# Patient Record
Sex: FEMALE | Race: WHITE | Employment: FULL TIME
[De-identification: names, ages, dates, MRNs, and addresses within clinical notes are randomized per-mention and may not be internally consistent; named-entity substitution may affect disease eponyms.]

---

## 2017-08-27 ENCOUNTER — HEALTH MAINTENANCE LETTER (OUTPATIENT)
Age: 37
End: 2017-08-27

## 2020-10-05 ENCOUNTER — TRANSFERRED RECORDS (OUTPATIENT)
Dept: HEALTH INFORMATION MANAGEMENT | Facility: CLINIC | Age: 40
End: 2020-10-05

## 2020-10-06 ENCOUNTER — TRANSFERRED RECORDS (OUTPATIENT)
Dept: HEALTH INFORMATION MANAGEMENT | Facility: CLINIC | Age: 40
End: 2020-10-06

## 2020-12-08 ENCOUNTER — TRANSFERRED RECORDS (OUTPATIENT)
Dept: HEALTH INFORMATION MANAGEMENT | Facility: CLINIC | Age: 40
End: 2020-12-08

## 2020-12-11 ENCOUNTER — TRANSFERRED RECORDS (OUTPATIENT)
Dept: HEALTH INFORMATION MANAGEMENT | Facility: CLINIC | Age: 40
End: 2020-12-11

## 2020-12-18 ENCOUNTER — MEDICAL CORRESPONDENCE (OUTPATIENT)
Dept: HEALTH INFORMATION MANAGEMENT | Facility: CLINIC | Age: 40
End: 2020-12-18

## 2020-12-18 ENCOUNTER — TRANSFERRED RECORDS (OUTPATIENT)
Dept: HEALTH INFORMATION MANAGEMENT | Facility: CLINIC | Age: 40
End: 2020-12-18

## 2020-12-21 ENCOUNTER — TRANSFERRED RECORDS (OUTPATIENT)
Dept: HEALTH INFORMATION MANAGEMENT | Facility: CLINIC | Age: 40
End: 2020-12-21

## 2020-12-22 ENCOUNTER — TRANSCRIBE ORDERS (OUTPATIENT)
Dept: OTHER | Age: 40
End: 2020-12-22

## 2020-12-22 DIAGNOSIS — K58.0 IRRITABLE BOWEL SYNDROME WITH DIARRHEA: Primary | ICD-10-CM

## 2020-12-22 DIAGNOSIS — R10.84 ABDOMINAL PAIN, GENERALIZED: ICD-10-CM

## 2020-12-22 DIAGNOSIS — K86.0 ALCOHOL-INDUCED CHRONIC PANCREATITIS (H): Primary | ICD-10-CM

## 2020-12-23 ENCOUNTER — TELEPHONE (OUTPATIENT)
Dept: GASTROENTEROLOGY | Facility: CLINIC | Age: 40
End: 2020-12-23

## 2020-12-23 NOTE — TELEPHONE ENCOUNTER
Advanced [facility]oscopy     Referring provider:  Candy Colmenares MD    Referred to: [facility]     Provider Requested:  NA     Referral Received: 12/23/2020     Records received: in CareEverywhere     Images received: in Pacs    Evaluation for: PD stones, chronic pancreatitis, pancreatic  divisum?     Clinical History (per RN review):     39 y.o. old female who is seen as a consult for evaluation of chronic pancreatitis. Patient having persistent abdominal pain and weight loss. Off alcohol for last two years. Several years of recurrent pancreatitis. Imaging now showed lots of calcifications in the head of pancreas and a dilated PD.     . Main duct dilated throughout body and tail. Duct does look like it is heading into minor papilla. Suspicious for pancreas divisum, although official report not able to deny or make diagnosis of pancreas divisum.   I will refer patient to [facility] for their expert opinion about possible ERCP for PD stone removal and treatment for chronic pancreatitis.      EUS 12/11/2020  Recommendation:      - Discharge patient to home.                       - obtain MRCP to try to see PD better.                       - try pancreatic enzyme                       - follow up with phone visit after MRCP. Will discuss                        referral to the [facility] if no improvement in symptoms.    MRCP 12/18/2020  INDICATION: Alcohol induced chronic pancreatitis. Concern for   pancreatic ductal stricture and calculi.    IMPRESSION:   1.  Extensive changes of chronic pancreatitis with evidence for main   ductal stricture and at least one intraductal calculus in the head as   above. Consider ERCP with consideration of stent placement as   clinically warranted.  2.  No superimposed acute pancreatitis or pseudocyst.  3.  Mild hepatic steatosis. Liver parenchyma otherwise normal.  4.  No biliary dilatation or choledocholithiasis.  MD review date:   MD Decision for clinic consultation/Orders:             Referral updates/Patient contacted:   12/30- called patient, talked to her, updated on POC  Called again to get imaging, not pushed on 12/28  [facility]    [phone]    1/5- called to get imaging  [facility]    [address]    [address] [place] [address]    Phone: [phone]    Fax: [phone]

## 2021-02-15 ENCOUNTER — TRANSFERRED RECORDS (OUTPATIENT)
Dept: GASTROENTEROLOGY | Facility: CLINIC | Age: 41
End: 2021-02-15

## 2021-02-15 ENCOUNTER — VIRTUAL VISIT (OUTPATIENT)
Dept: GASTROENTEROLOGY | Facility: CLINIC | Age: 41
End: 2021-02-15
Payer: COMMERCIAL

## 2021-02-15 VITALS — HEIGHT: 63 IN | WEIGHT: 133 LBS | BODY MASS INDEX: 23.57 KG/M2

## 2021-02-15 DIAGNOSIS — Q45.3 PANCREAS DIVISUM: Primary | ICD-10-CM

## 2021-02-15 PROCEDURE — 99204 OFFICE O/P NEW MOD 45 MIN: CPT | Mod: GT | Performed by: INTERNAL MEDICINE

## 2021-02-15 RX ORDER — PROCHLORPERAZINE MALEATE 10 MG
10 TABLET ORAL
COMMUNITY
Start: 2020-11-21 | End: 2021-06-11

## 2021-02-15 RX ORDER — ATENOLOL 25 MG/1
25 TABLET ORAL DAILY
COMMUNITY
Start: 2019-11-12

## 2021-02-15 RX ORDER — GABAPENTIN 100 MG/1
100 CAPSULE ORAL 3 TIMES DAILY
COMMUNITY
End: 2021-08-02

## 2021-02-15 SDOH — HEALTH STABILITY: MENTAL HEALTH: HOW OFTEN DO YOU HAVE A DRINK CONTAINING ALCOHOL?: NOT ASKED

## 2021-02-15 SDOH — HEALTH STABILITY: MENTAL HEALTH: HOW MANY STANDARD DRINKS CONTAINING ALCOHOL DO YOU HAVE ON A TYPICAL DAY?: NOT ASKED

## 2021-02-15 SDOH — HEALTH STABILITY: MENTAL HEALTH: HOW OFTEN DO YOU HAVE 6 OR MORE DRINKS ON ONE OCCASION?: NOT ASKED

## 2021-02-15 ASSESSMENT — MIFFLIN-ST. JEOR: SCORE: 1242.41

## 2021-02-15 ASSESSMENT — PAIN SCALES - GENERAL: PAINLEVEL: NO PAIN (0)

## 2021-02-15 NOTE — LETTER
2/15/2021      RE: Keerthi Montemayor  [address] [facility]  [address]  [place] [address]      Dear Colleague,    Thank you for referring your patient, Keerthi Montemayor, to the [facility] [place]. Please see a copy of my visit note below.    Start: 02/15/2021 01:45 pm  Stop: 02/15/2021 02:30 pm      Pt referred by Dr Colmenares of /[facility]    Recurrent episodes of acute on chronic pancreatitis x 8 years. EUS findings of intraductal pancreatic stones.Likely divisum. Referred for consideration of endoscopic therapy. Abstinent from alcohol for 2 full years. In a program till a year ago, now sustained sobriety. Smoking down to 3 cigarettes daily from a pack, x many years.    About 7-8 hospitalizations over 8 years. Longest 7 days.    Recently seen Dr Colmenares at . Had severe flare of abominal pain. Lasting months. About December, pain relented so having minimal pain now. Tried PERT but no effect on pain at that time.     CT and MRCP uploaded, reviewed. That and EUS by Dr Colmenares, all show diffusely dilated dorsal duct down to minor papilla, with 5-6mm intraductal stone near minor papilla. Large cluster of stones in ventral duct, apparent pancreas divisum, probably congenital, although could represent pseudodivisum due to stone disease    EUS 12/11/2020 Dr Colmenares       39 year old female with history of alcohol abuse,                        stopped 2 years ago. Now with persistent abdominal                        pain, significant weight loss. CT showed calcifiic                        chronic pancreatitis.                       - severe chronic pancreatitis, especially head of                        pancreas, with many shadowing calcifications. One 5mm                        intraductal stone in the superior head of pancreas                        (near the minor papilla) seems to be obstructing the                        main pancreatic duct. Duct dilated to 8mm in body.                        Unable to  exclude pancreas divisum as the main PD                        could not be visualized completely in head of                        pancreas, likely due to strictures. There was more                        stone material within an anechoic tubular structure,                        presumed to be the ventral pancreatic duct. No cystic                        or mass lesion seen in this exam.                       - distal CBD stricture as it goes through head of                        pancreas, but no upstream dilation. Duct went from                        4.8mm in mid portion to 1.8mm in distal portion.                        Normal gallbladder                       - bulging appearance to major papilla corresponding                        to 8mm thickening of deep mucosa.    On no current pain meds. Lexapro, atenolol, rest as below      PAST MEDICAL HISTORY: The patient   Past Medical History:   Diagnosis Date     Alcoholic hepatitis     Alcoholism ([facility]C)     Chronic pancreatitis ([facility]C)     GERD (gastroesophageal reflux disease)   HTN    PAST SURGICAL HISTORY:   Past Surgical History:   Procedure Laterality Date     SURGICAL HX - NEG     MEDICATIONS: Patient   Current Outpatient Medications   Medication Sig Dispense Refill     aluminum & magnesium hydroxide-simethicone (MAALOX MAX) 400-400-40 MG/5ML suspension Take 15 mL by mouth every 6 hours as needed. 355 mL 1     atenolol (TENORMIN) 25 MG tablet Take 1 Tablet by mouth daily. 90 Tablet 3     escitalopram oxalate (LEXAPRO) 10 MG tablet Take 1 Tablet by mouth daily. 30 Tablet 0     hydrOXYzine HCl (ATARAX) 25 MG tablet Take 1 Tablet by mouth three times a day as needed. 30 Tablet 1     lidocaine viscous (XYLOCAINE) 2 % solution Take 15 mL by mouth every 6 hours as needed for Pain. 100 mL 0     omeprazole (PRILOSEC) 20 MG capsule Take 2 Capsules by mouth daily. Take 1 hour before a meal. 90 Capsule 3     ondansetron (ZOFRAN-ODT) 4 MG disintegrating tablet Place 4 mg  under tongue as needed.     pantoprazole (PROTONIX) 40 MG tablet Take 1 Tablet by mouth daily. 90 Tablet 3     prochlorperazine (COMPAZINE) 10 MG tablet Take 1 Tablet by mouth every 8 hours as needed for Nausea or Vomiting. 30 Tablet 1     sucralfate (CARAFATE) 1 g tablet COMBINE 1 TAB WITH 10 ML OF WATER & TAKE 4 TIMES DAILY BEFORE MEALS 120 Tablet 2     triamcinolone acetonide (KENALOG) 0.1 % cream Apply topically three times a day. For rash for up to 2 weeks at an episode. Indications: Skin Inflammation 80 g 3     Current Facility-Administered Medications   Medication Dose Route Frequency Provider Last Rate Last Admin     butamben-tetracaine-benzocaine (CETACAINE) 2-2-14 % spray 1-2 Spray 1-2 Spray Topical PRN with procedures Candy Colmenares MD     diphenhydrAMINE (BENADRYL) injection 25-50 mg 25-50 mg Intravenous ONCE PRN Candy Colmenares MD     fentaNYL (SUBLIMAZE) injection  mcg  mcg Intravenous PRN with procedures Candy Colmenares MD     flumazenil (ROMAZICON) injection 0.2-0.5 mg 0.2-0.5 mg Intravenous PRN with procedures Candy Colmenares MD     midazolam (VERSED) injection 0.5-2 mg 0.5-2 mg Intravenous PRN with procedures Candy Colmenares MD     naloxone (NARCAN) injection 0.4-2 mg 0.4-2 mg Intravenous PRN with procedures Candy Colmenares MD     sodium chloride 0.9% infusion 500 mL Intravenous PRN per Parameters Candy Colmenares MD     ALLERGIES: Amoxicillin    SOCIAL HISTORY: The patient reports that she has been smoking. She has never used smokeless tobacco. She reports previous alcohol use.    FAMILY HISTORY: The patient's family history includes Hyperlipidemia in her birth mother; Hypertension in her birth mother.     IMP: 45 minutes more than 50% counseling. Total 50 minutes including review of outside imaging, CT, MRCP and EUS report  We discussed that although pain free currently, with obstructing intraductal stone and history of recurrent acute and chronic  pancreatitis flares. Recurrence very likely. All imaging and EUS suggest that complete dorsal pancreatic duct clearance very feasible, via minor papilla. Would not attempt to clear ventral duct due to large stone burden, lack of obstruction there. Overall, results of complete endoscopic duct clearance similar to decompressive surgery, which is not necessary in this patient. Went over risks of complications, which are relatively low but real, and chance of benefit, which is relatively high although never certain. Also discussed other adjunctive measures including dietitian consult, if covered here, and eventual trial of enzymes, but after stone clearance.    1) Schedule ERCP w Dr Ashford in next month.   2) Having H and P at [facility] today, so above ideal  3) Ling Roldan RD consult for chronic pancreatitis diet , if covered by [facility]  4) + - restarting enzymes, might wait until after PD stone extraction complete     Thanks for Dr Colmenares for this kind referral    Janes Ashford MD GI Staff

## 2021-02-15 NOTE — NURSING NOTE
Per provider request, get CT from [facility]    Called [facility], asked them to push records  EXAM: CT ABDOMEN PELVIS W  LOCATION: [facility]  DATE/TIME: 8/13/2020 8:14 AM    Called file room to attach, provider updated.    ML

## 2021-02-15 NOTE — NURSING NOTE
"Chief Complaint   Patient presents with     Consult     New patient consult video visit.        Vitals:    02/15/21 1253   Weight: 60.3 kg (133 lb)   Height: 1.6 m (5' 3\")       Body mass index is 23.56 kg/m .    Ronny Solis LPN    "

## 2021-02-15 NOTE — PROGRESS NOTES
Start: 02/15/2021 01:45 pm  Stop: 02/15/2021 02:30 pm      Pt referred by Dr Colmenares of /[facility]    Recurrent episodes of acute on chronic pancreatitis x 8 years. EUS findings of intraductal pancreatic stones.Likely divisum. Referred for consideration of endoscopic therapy. Abstinent from alcohol for 2 full years. In a program till a year ago, now sustained sobriety. Smoking down to 3 cigarettes daily from a pack, x many years.    About 7-8 hospitalizations over 8 years. Longest 7 days.    Recently seen Dr Colmenares at . Had severe flare of abominal pain. Lasting months. About December, pain relented so having minimal pain now. Tried PERT but no effect on pain at that time.     CT and MRCP uploaded, reviewed. That and EUS by Dr Colmenares, all show diffusely dilated dorsal duct down to minor papilla, with 5-6mm intraductal stone near minor papilla. Large cluster of stones in ventral duct, apparent pancreas divisum, probably congenital, although could represent pseudodivisum due to stone disease    EUS 12/11/2020 Dr Colmenares       39 year old female with history of alcohol abuse,                        stopped 2 years ago. Now with persistent abdominal                        pain, significant weight loss. CT showed calcifiic                        chronic pancreatitis.                       - severe chronic pancreatitis, especially head of                        pancreas, with many shadowing calcifications. One 5mm                        intraductal stone in the superior head of pancreas                        (near the minor papilla) seems to be obstructing the                        main pancreatic duct. Duct dilated to 8mm in body.                        Unable to exclude pancreas divisum as the main PD                        could not be visualized completely in head of                        pancreas, likely due to strictures. There was more                        stone material within an anechoic tubular structure,                         presumed to be the ventral pancreatic duct. No cystic                        or mass lesion seen in this exam.                       - distal CBD stricture as it goes through head of                        pancreas, but no upstream dilation. Duct went from                        4.8mm in mid portion to 1.8mm in distal portion.                        Normal gallbladder                       - bulging appearance to major papilla corresponding                        to 8mm thickening of deep mucosa.    On no current pain meds. Lexapro, atenolol, rest as below      PAST MEDICAL HISTORY: The patient   Past Medical History:   Diagnosis Date     Alcoholic hepatitis     Alcoholism ([facility]C)     Chronic pancreatitis ([facility]C)     GERD (gastroesophageal reflux disease)   HTN    PAST SURGICAL HISTORY:   Past Surgical History:   Procedure Laterality Date     SURGICAL HX - NEG     MEDICATIONS: Patient   Current Outpatient Medications   Medication Sig Dispense Refill     aluminum & magnesium hydroxide-simethicone (MAALOX MAX) 400-400-40 MG/5ML suspension Take 15 mL by mouth every 6 hours as needed. 355 mL 1     atenolol (TENORMIN) 25 MG tablet Take 1 Tablet by mouth daily. 90 Tablet 3     escitalopram oxalate (LEXAPRO) 10 MG tablet Take 1 Tablet by mouth daily. 30 Tablet 0     hydrOXYzine HCl (ATARAX) 25 MG tablet Take 1 Tablet by mouth three times a day as needed. 30 Tablet 1     lidocaine viscous (XYLOCAINE) 2 % solution Take 15 mL by mouth every 6 hours as needed for Pain. 100 mL 0     omeprazole (PRILOSEC) 20 MG capsule Take 2 Capsules by mouth daily. Take 1 hour before a meal. 90 Capsule 3     ondansetron (ZOFRAN-ODT) 4 MG disintegrating tablet Place 4 mg under tongue as needed.     pantoprazole (PROTONIX) 40 MG tablet Take 1 Tablet by mouth daily. 90 Tablet 3     prochlorperazine (COMPAZINE) 10 MG tablet Take 1 Tablet by mouth every 8 hours as needed for Nausea or Vomiting. 30 Tablet 1     sucralfate (CARAFATE) 1 g tablet  COMBINE 1 TAB WITH 10 ML OF WATER & TAKE 4 TIMES DAILY BEFORE MEALS 120 Tablet 2     triamcinolone acetonide (KENALOG) 0.1 % cream Apply topically three times a day. For rash for up to 2 weeks at an episode. Indications: Skin Inflammation 80 g 3     Current Facility-Administered Medications   Medication Dose Route Frequency Provider Last Rate Last Admin     butamben-tetracaine-benzocaine (CETACAINE) 2-2-14 % spray 1-2 Spray 1-2 Spray Topical PRN with procedures Candy Colmenares MD     diphenhydrAMINE (BENADRYL) injection 25-50 mg 25-50 mg Intravenous ONCE PRN Candy Colmenares MD     fentaNYL (SUBLIMAZE) injection  mcg  mcg Intravenous PRN with procedures Candy Colmenares MD     flumazenil (ROMAZICON) injection 0.2-0.5 mg 0.2-0.5 mg Intravenous PRN with procedures Candy Colmenares MD     midazolam (VERSED) injection 0.5-2 mg 0.5-2 mg Intravenous PRN with procedures Candy Colmenares MD     naloxone (NARCAN) injection 0.4-2 mg 0.4-2 mg Intravenous PRN with procedures Candy Colmenares MD     sodium chloride 0.9% infusion 500 mL Intravenous PRN per Parameters Candy Colmenares MD     ALLERGIES: Amoxicillin    SOCIAL HISTORY: The patient reports that she has been smoking. She has never used smokeless tobacco. She reports previous alcohol use.    FAMILY HISTORY: The patient's family history includes Hyperlipidemia in her birth mother; Hypertension in her birth mother.     IMP: 45 minutes more than 50% counseling. Total 50 minutes including review of outside imaging, CT, MRCP and EUS report  We discussed that although pain free currently, with obstructing intraductal stone and history of recurrent acute and chronic pancreatitis flares. Recurrence very likely. All imaging and EUS suggest that complete dorsal pancreatic duct clearance very feasible, via minor papilla. Would not attempt to clear ventral duct due to large stone burden, lack of obstruction there. Overall, results of  complete endoscopic duct clearance similar to decompressive surgery, which is not necessary in this patient. Went over risks of complications, which are relatively low but real, and chance of benefit, which is relatively high although never certain. Also discussed other adjunctive measures including dietitian consult, if covered here, and eventual trial of enzymes, but after stone clearance.    1) Schedule ERCP w Dr Ashford in next month.   2) Having H and P at [facility] today, so above ideal  3) Ling Roldan RD consult for chronic pancreatitis diet , if covered by [facility]  4) + - restarting enzymes, might wait until after PD stone extraction complete       Thanks for Dr Colmenares for this kind referral    Janes Ashford MD GI Staff

## 2021-02-16 ENCOUNTER — TELEPHONE (OUTPATIENT)
Dept: GASTROENTEROLOGY | Facility: CLINIC | Age: 41
End: 2021-02-16

## 2021-02-16 NOTE — TELEPHONE ENCOUNTER
Called Pt to follow up on clinic visit yesterday with Dr. Ashford. No answer. Left VM to call back. Will also need to schedule for ERCP with Dr. Ashford. Dates from [facility] are: 3/23 (only one more opening) and 3/30.    Bandar Javier LPN

## 2021-02-16 NOTE — PATIENT INSTRUCTIONS
Follow up:    Dr. Ashford has outlined the following steps after your recent clinic visit:    1) Schedule ERCP with Dr Ashford in the next month.   2) Having H and P at [facility] today  3) Schedule for a consult with Dietitian Ling Roldan RD for chronic pancreatitis diet    Please call with any questions or concerns regarding your clinic visit today.    It is a pleasure being involved in your health care.     Contacts post-consultation depending on your need:    Schedule Clinic Appointments            [phone] # 1   M-F 7:30 - 5 pm    Lolita Taylor RN Care Coordinator  [phone]    Bandar Javier LPN    [phone]     OR Procedure Scheduling                             [phone]    My Chart is available 24 hours a day and is a secure way to access your records and communicate with your care team.  I strongly recommend signing up if you haven't already done so, if you are comfortable with computers.  If you would like to inquire about this or are having problems with My Chart access, you may call [phone] or go online at [email or web address].  Please allow at least 24 hours for a response and extra time on weekends and Holidays.

## 2021-02-17 ENCOUNTER — TELEPHONE (OUTPATIENT)
Dept: GASTROENTEROLOGY | Facility: CLINIC | Age: 41
End: 2021-02-17

## 2021-02-17 ENCOUNTER — PREP FOR PROCEDURE (OUTPATIENT)
Dept: GASTROENTEROLOGY | Facility: CLINIC | Age: 41
End: 2021-02-17

## 2021-02-17 DIAGNOSIS — K86.89 PANCREATIC STONES: Primary | ICD-10-CM

## 2021-02-17 NOTE — TELEPHONE ENCOUNTER
Called to discuss different dates with patient about scheduling ERCP. Pt would like to get scheduled on 3/23/21. Explained she can expect a call for time when closer to procedure date, will need a , someone to stay with them for 24 hours and should stay in town for 24 hours (within 45 min of Hospital) post procedure     Patient needs to get pre-op physical completed and will fax a copy to us along with bringing a hard copy with them. Fax number given. [phone] *If you do not get a preop physical, your procedure could be cancelled, patient voiced understanding*     Preop Plan: Pt will have one done with her PCP    Med Review     Blood thinner - NA  ASA - NA  Diabetic - NA     COVID test discussed: Pt knows to have test done within 4 days of procedure    Patient Education r/t procedure:     Does patient have any history of gastric bypass/gastric surgery/altered panc/bili anatomy? No    A pre-op nurse will call 1-2 days prior to the procedure. Is advised to be NPO/no solid food 8 hours before the procedure. Ok to drink clear liquids (Water, Apple Juice or Gatorade) up to 2 hours prior to procedure.     Other specific details/comments:     Verbalized understanding of all instructions. All questions answered.       Bandar Javier LPN

## 2021-03-06 DIAGNOSIS — Z11.59 ENCOUNTER FOR SCREENING FOR OTHER VIRAL DISEASES: ICD-10-CM

## 2021-03-08 ENCOUNTER — TRANSFERRED RECORDS (OUTPATIENT)
Dept: HEALTH INFORMATION MANAGEMENT | Facility: CLINIC | Age: 41
End: 2021-03-08

## 2021-03-19 DIAGNOSIS — Z11.59 ENCOUNTER FOR SCREENING FOR OTHER VIRAL DISEASES: ICD-10-CM

## 2021-03-19 LAB
LABORATORY COMMENT REPORT: NORMAL
SARS-COV-2 RNA RESP QL NAA+PROBE: NEGATIVE
SARS-COV-2 RNA RESP QL NAA+PROBE: NORMAL
SPECIMEN SOURCE: NORMAL
SPECIMEN SOURCE: NORMAL

## 2021-03-19 PROCEDURE — U0003 INFECTIOUS AGENT DETECTION BY NUCLEIC ACID (DNA OR RNA); SEVERE ACUTE RESPIRATORY SYNDROME CORONAVIRUS 2 (SARS-COV-2) (CORONAVIRUS DISEASE [COVID-19]), AMPLIFIED PROBE TECHNIQUE, MAKING USE OF HIGH THROUGHPUT TECHNOLOGIES AS DESCRIBED BY CMS-2020-01-R: HCPCS | Performed by: INTERNAL MEDICINE

## 2021-03-22 ENCOUNTER — ANESTHESIA EVENT (OUTPATIENT)
Dept: SURGERY | Facility: CLINIC | Age: 41
End: 2021-03-22
Payer: COMMERCIAL

## 2021-03-22 RX ORDER — LIDOCAINE HYDROCHLORIDE 20 MG/ML
15 SOLUTION OROPHARYNGEAL
COMMUNITY
Start: 2021-02-26

## 2021-03-22 RX ORDER — GABAPENTIN 100 MG/1
100 CAPSULE ORAL
COMMUNITY
Start: 2021-03-15 | End: 2021-03-22

## 2021-03-22 RX ORDER — TRIAMCINOLONE ACETONIDE 5 MG/G
CREAM TOPICAL
COMMUNITY
Start: 2021-02-01

## 2021-03-22 RX ORDER — ONDANSETRON 8 MG/1
TABLET, ORALLY DISINTEGRATING ORAL
COMMUNITY
Start: 2020-11-23

## 2021-03-22 RX ORDER — FLUOXETINE 40 MG/1
40 CAPSULE ORAL
COMMUNITY
Start: 2021-02-15 | End: 2021-06-11

## 2021-03-22 RX ORDER — MAGNESIUM HYDROXIDE/ALUMINUM HYDROXICE/SIMETHICONE 120; 1200; 1200 MG/30ML; MG/30ML; MG/30ML
30 SUSPENSION ORAL
COMMUNITY
Start: 2021-02-26

## 2021-03-22 NOTE — ANESTHESIA PREPROCEDURE EVALUATION
Anesthesia Pre-Procedure Evaluation    Patient: Keerthi Montemayor   MRN: [id number] : 1980        Preoperative Diagnosis: Pancreatic stones [K86.89]   Procedure : Procedure(s):  ENDOSCOPIC RETROGRADE CHOLANGIOPANCREATOGRAPHY     Past Medical History:   Diagnosis Date     Gastroesophageal reflux disease      Pancreatic disease       Past Surgical History:   Procedure Laterality Date     HC COLONOSCOPY THRU STOMA, DIAGNOSTIC      done for IBS, NORMAL exam     NO HISTORY OF SURGERY        Allergies   Allergen Reactions     Penicillins Hives     Amoxicillin Hives      Social History     Tobacco Use     Smoking status: Current Every Day Smoker     Smokeless tobacco: Never Used   Substance Use Topics     Alcohol use: Not Currently     Comment: 6-7 beers daily      Wt Readings from Last 1 Encounters:   02/15/21 60.3 kg (133 lb)        Anesthesia Evaluation   Pt has had prior anesthetic. Type: General and MAC.    No history of anesthetic complications       ROS/MED HX  ENT/Pulmonary:     (+) tobacco use, Current use, 1  Pack-Year Hx,      Neurologic:  - neg neurologic ROS     Cardiovascular:     (+) hypertension-----    METS/Exercise Tolerance: >4 METS    Hematologic:  - neg hematologic  ROS     Musculoskeletal:  - neg musculoskeletal ROS     GI/Hepatic: Comment: Chronic pancreatitis with pancreatic duct stones    Celiac disease    (+) GERD,     Renal/Genitourinary:       Endo:  - neg endo ROS     Psychiatric/Substance Use:     (+) psychiatric history anxiety alcohol abuse Recreational drug usage: Cannabis.    Infectious Disease:  - neg infectious disease ROS     Malignancy:  - neg malignancy ROS     Other:            Physical Exam    Airway        Mallampati: II   TM distance: > 3 FB   Neck ROM: full   Mouth opening: > 3 cm    Respiratory Devices and Support         Dental  no notable dental history         Cardiovascular   cardiovascular exam normal          Pulmonary   pulmonary exam normal                OUTSIDE  LABS:  CBC:   Lab Results   Component Value Date    WBC 11.1 (H) 05/31/2007    WBC 8.4 06/01/2006    HGB 13.4 05/31/2007    HGB 14.6 06/01/2006    HCT 37.8 05/31/2007    HCT 43.7 06/01/2006     (L) 05/31/2007     06/01/2006     BMP:   Lab Results   Component Value Date     05/31/2007     07/31/2006    POTASSIUM 3.5 05/31/2007    POTASSIUM 3.7 07/31/2006    CHLORIDE 106 05/31/2007    CHLORIDE 104 07/31/2006    CO2 24 05/31/2007    CO2 26 07/31/2006    BUN 6 05/31/2007    BUN 9 07/31/2006    CR 0.66 05/31/2007    CR 0.80 07/31/2006     (H) 05/31/2007    GLC 73 07/31/2006     COAGS: No results found for: PTT, INR, FIBR  POC:   Lab Results   Component Value Date    HCG Negative 07/31/2006     HEPATIC:   Lab Results   Component Value Date    ALBUMIN 4.4 05/31/2007    PROTTOTAL 8.1 05/31/2007    ALT 28 05/31/2007    AST 27 05/31/2007    ALKPHOS 83 05/31/2007    BILITOTAL 0.4 05/31/2007     OTHER:   Lab Results   Component Value Date    PH 6.5 06/01/2006    HREMAN 9.1 05/31/2007    LIPASE 63 05/31/2007       Anesthesia Plan    ASA Status:  2      Anesthesia Type: General.     - Airway: ETT   Induction: Intravenous.   Maintenance: Balanced.        Consents    Anesthesia Plan(s) and associated risks, benefits, and realistic alternatives discussed. Questions answered and patient/representative(s) expressed understanding.     - Discussed with:  Patient      - Extended Intubation/Ventilatory Support Discussed: No.      - Patient is DNR/DNI Status: No    Use of blood products discussed: No .     Postoperative Care    Pain management: Oral pain medications.   PONV prophylaxis: Ondansetron (or other 5HT-3)     Comments:    Discussed risks and benefits of general ETT anesthesia with patient.  Questions answered, patient states understanding and wishes to proceed.              Alec Kelly MD

## 2021-03-22 NOTE — BRIEF OP NOTE
[facility] Brief Operative Note    Pre-operative diagnosis: Pancreatic stones [K86.89]   Post-operative diagnosis Pancreatic duct stone   Procedure: Procedure(s):  ENDOSCOPIC RETROGRADE CHOLANGIOPANCREATOGRAPHY, WITH PANCREATIC DUCT DILATION AND STENT PLACEMENT X 2 , BILE DUCT STENT PLACEMENT X 1, MINOR PAPILLOTOMY, BILLARIAY SPHINCTEROTOMY   Surgeon: Janes Ashford MD   Assistants(s): Raymond Bautista MD   Estimated blood loss: Minimal    Specimens: None       Findings:  - Minor papillotomy with 2 dorsal pancreatic duct stents.  - Biliary sphincterotomy with 1 biliary stent.    Recommendations:  - Observe in PACU and discharge.  - Prescribed Percocet to discharge pharmacy.  - No antiplatelets or anticoagulation for 3-days.    Raymond Bautista MD on 3/22/2021 at 5:14 PM    History:  41 yo F w/ h/o recurrent acute-on-chronic pancreatitis (etiology: EtOH + tobacco] x8-yrs). S/P EUS (12/11/20; Dr. Colmenares) w/ likely pancreas divisum w/ dorsal duct stones. No prior clinical response to PERT. CT/MRI w/ diffusely dilated dorsal duct down to minor papilla, w/ 5-6mm intraductal stone near minor papilla; large cluster of stones in ventral duct; pancreas divisum vs pseudo-divisum.

## 2021-03-23 ENCOUNTER — APPOINTMENT (OUTPATIENT)
Dept: GENERAL RADIOLOGY | Facility: CLINIC | Age: 41
End: 2021-03-23
Attending: INTERNAL MEDICINE
Payer: COMMERCIAL

## 2021-03-23 ENCOUNTER — HOSPITAL ENCOUNTER (OUTPATIENT)
Facility: CLINIC | Age: 41
Discharge: HOME | End: 2021-03-23
Attending: INTERNAL MEDICINE | Admitting: INTERNAL MEDICINE
Payer: COMMERCIAL

## 2021-03-23 ENCOUNTER — ANESTHESIA (OUTPATIENT)
Dept: SURGERY | Facility: CLINIC | Age: 41
End: 2021-03-23
Payer: COMMERCIAL

## 2021-03-23 VITALS
WEIGHT: 138.45 LBS | SYSTOLIC BLOOD PRESSURE: 118 MMHG | OXYGEN SATURATION: 98 % | TEMPERATURE: 97.6 F | RESPIRATION RATE: 14 BRPM | HEIGHT: 63 IN | HEART RATE: 71 BPM | BODY MASS INDEX: 24.53 KG/M2 | DIASTOLIC BLOOD PRESSURE: 78 MMHG

## 2021-03-23 DIAGNOSIS — K86.89 PANCREATIC STONES: ICD-10-CM

## 2021-03-23 DIAGNOSIS — K86.1 CHRONIC CALCIFIC PANCREATITIS (H): Primary | ICD-10-CM

## 2021-03-23 LAB
ALBUMIN SERPL-MCNC: 3.7 G/DL (ref 3.4–5)
ALP SERPL-CCNC: 65 U/L (ref 40–150)
ALT SERPL W P-5'-P-CCNC: 19 U/L (ref 0–50)
AMYLASE SERPL-CCNC: 49 U/L (ref 30–110)
ANION GAP SERPL CALCULATED.3IONS-SCNC: 4 MMOL/L (ref 3–14)
AST SERPL W P-5'-P-CCNC: 8 U/L (ref 0–45)
BILIRUB SERPL-MCNC: 0.5 MG/DL (ref 0.2–1.3)
BUN SERPL-MCNC: 6 MG/DL (ref 7–30)
CALCIUM SERPL-MCNC: 8.8 MG/DL (ref 8.5–10.1)
CHLORIDE SERPL-SCNC: 100 MMOL/L (ref 94–109)
CO2 SERPL-SCNC: 25 MMOL/L (ref 20–32)
CREAT SERPL-MCNC: 0.62 MG/DL (ref 0.52–1.04)
ERYTHROCYTE [DISTWIDTH] IN BLOOD BY AUTOMATED COUNT: 14.7 % (ref 10–15)
GFR SERPL CREATININE-BSD FRML MDRD: >90 ML/MIN/{1.73_M2}
GLUCOSE BLDC GLUCOMTR-MCNC: 88 MG/DL (ref 70–99)
GLUCOSE SERPL-MCNC: 86 MG/DL (ref 70–99)
HCG UR QL: NEGATIVE
HCT VFR BLD AUTO: 31.7 % (ref 35–47)
HGB BLD-MCNC: 10.5 G/DL (ref 11.7–15.7)
INR PPP: 1.19 (ref 0.86–1.14)
LIPASE SERPL-CCNC: 94 U/L (ref 73–393)
MCH RBC QN AUTO: 26.3 PG (ref 26.5–33)
MCHC RBC AUTO-ENTMCNC: 33.1 G/DL (ref 31.5–36.5)
MCV RBC AUTO: 79 FL (ref 78–100)
PLATELET # BLD AUTO: 233 10E9/L (ref 150–450)
POTASSIUM SERPL-SCNC: 3.8 MMOL/L (ref 3.4–5.3)
PROT SERPL-MCNC: 7.1 G/DL (ref 6.8–8.8)
RBC # BLD AUTO: 4 10E12/L (ref 3.8–5.2)
SODIUM SERPL-SCNC: 129 MMOL/L (ref 133–144)
WBC # BLD AUTO: 9.2 10E9/L (ref 4–11)

## 2021-03-23 PROCEDURE — 85610 PROTHROMBIN TIME: CPT | Performed by: STUDENT IN AN ORGANIZED HEALTH CARE EDUCATION/TRAINING PROGRAM

## 2021-03-23 PROCEDURE — 80053 COMPREHEN METABOLIC PANEL: CPT | Performed by: STUDENT IN AN ORGANIZED HEALTH CARE EDUCATION/TRAINING PROGRAM

## 2021-03-23 PROCEDURE — C1725 CATH, TRANSLUMIN NON-LASER: HCPCS | Performed by: INTERNAL MEDICINE

## 2021-03-23 PROCEDURE — 250N000009 HC RX 250: Performed by: INTERNAL MEDICINE

## 2021-03-23 PROCEDURE — C1769 GUIDE WIRE: HCPCS | Performed by: INTERNAL MEDICINE

## 2021-03-23 PROCEDURE — 999N000181 XR SURGERY CARM FLUORO GREATER THAN 5 MIN W STILLS: Mod: TC

## 2021-03-23 PROCEDURE — 250N000025 HC SEVOFLURANE, PER MIN: Performed by: INTERNAL MEDICINE

## 2021-03-23 PROCEDURE — 250N000011 HC RX IP 250 OP 636: Performed by: ANESTHESIOLOGY

## 2021-03-23 PROCEDURE — 255N000002 HC RX 255 OP 636: Performed by: INTERNAL MEDICINE

## 2021-03-23 PROCEDURE — 81025 URINE PREGNANCY TEST: CPT | Performed by: ANESTHESIOLOGY

## 2021-03-23 PROCEDURE — 360N000082 HC SURGERY LEVEL 2 W/ FLUORO, PER MIN: Performed by: INTERNAL MEDICINE

## 2021-03-23 PROCEDURE — 250N000011 HC RX IP 250 OP 636: Performed by: STUDENT IN AN ORGANIZED HEALTH CARE EDUCATION/TRAINING PROGRAM

## 2021-03-23 PROCEDURE — 250N000011 HC RX IP 250 OP 636: Performed by: NURSE ANESTHETIST, CERTIFIED REGISTERED

## 2021-03-23 PROCEDURE — 999N000141 HC STATISTIC PRE-PROCEDURE NURSING ASSESSMENT: Performed by: INTERNAL MEDICINE

## 2021-03-23 PROCEDURE — C1877 STENT, NON-COAT/COV W/O DEL: HCPCS | Performed by: INTERNAL MEDICINE

## 2021-03-23 PROCEDURE — 370N000017 HC ANESTHESIA TECHNICAL FEE, PER MIN: Performed by: INTERNAL MEDICINE

## 2021-03-23 PROCEDURE — 250N000009 HC RX 250: Performed by: STUDENT IN AN ORGANIZED HEALTH CARE EDUCATION/TRAINING PROGRAM

## 2021-03-23 PROCEDURE — 82962 GLUCOSE BLOOD TEST: CPT

## 2021-03-23 PROCEDURE — 82150 ASSAY OF AMYLASE: CPT | Performed by: STUDENT IN AN ORGANIZED HEALTH CARE EDUCATION/TRAINING PROGRAM

## 2021-03-23 PROCEDURE — 36415 COLL VENOUS BLD VENIPUNCTURE: CPT | Performed by: STUDENT IN AN ORGANIZED HEALTH CARE EDUCATION/TRAINING PROGRAM

## 2021-03-23 PROCEDURE — 710N000010 HC RECOVERY PHASE 1, LEVEL 2, PER MIN: Performed by: INTERNAL MEDICINE

## 2021-03-23 PROCEDURE — 258N000003 HC RX IP 258 OP 636: Performed by: NURSE ANESTHETIST, CERTIFIED REGISTERED

## 2021-03-23 PROCEDURE — 250N000013 HC RX MED GY IP 250 OP 250 PS 637: Performed by: ANESTHESIOLOGY

## 2021-03-23 PROCEDURE — 250N000011 HC RX IP 250 OP 636: Performed by: INTERNAL MEDICINE

## 2021-03-23 PROCEDURE — 710N000012 HC RECOVERY PHASE 2, PER MINUTE: Performed by: INTERNAL MEDICINE

## 2021-03-23 PROCEDURE — 83690 ASSAY OF LIPASE: CPT | Performed by: STUDENT IN AN ORGANIZED HEALTH CARE EDUCATION/TRAINING PROGRAM

## 2021-03-23 PROCEDURE — 85027 COMPLETE CBC AUTOMATED: CPT | Performed by: STUDENT IN AN ORGANIZED HEALTH CARE EDUCATION/TRAINING PROGRAM

## 2021-03-23 PROCEDURE — 250N000009 HC RX 250: Performed by: NURSE ANESTHETIST, CERTIFIED REGISTERED

## 2021-03-23 PROCEDURE — 272N000001 HC OR GENERAL SUPPLY STERILE: Performed by: INTERNAL MEDICINE

## 2021-03-23 PROCEDURE — C1726 CATH, BAL DIL, NON-VASCULAR: HCPCS | Performed by: INTERNAL MEDICINE

## 2021-03-23 DEVICE — STENT FREEMAN PANCREA FLEX 5FRX9CM SGL PIGTAIL
Type: IMPLANTABLE DEVICE | Site: BILE DUCT | Status: NON-FUNCTIONAL
Removed: 2021-04-27

## 2021-03-23 DEVICE — STENT ADVANIX PANCREA PIGTAIL 3FRX12CM M00536840
Type: IMPLANTABLE DEVICE | Site: PANCREATIC DUCT | Status: NON-FUNCTIONAL
Removed: 2021-04-27

## 2021-03-23 DEVICE — IMPLANTABLE DEVICE
Type: IMPLANTABLE DEVICE | Site: PANCREATIC DUCT | Status: NON-FUNCTIONAL
Removed: 2021-04-27

## 2021-03-23 RX ORDER — ONDANSETRON 4 MG/1
4 TABLET, ORALLY DISINTEGRATING ORAL EVERY 30 MIN PRN
Status: DISCONTINUED | OUTPATIENT
Start: 2021-03-23 | End: 2021-03-23 | Stop reason: HOSPADM

## 2021-03-23 RX ORDER — OXYCODONE AND ACETAMINOPHEN 5; 325 MG/1; MG/1
1 TABLET ORAL EVERY 6 HOURS PRN
Qty: 15 TABLET | Refills: 0 | Status: SHIPPED | OUTPATIENT
Start: 2021-03-23 | End: 2021-03-31

## 2021-03-23 RX ORDER — IOPAMIDOL 510 MG/ML
INJECTION, SOLUTION INTRAVASCULAR PRN
Status: DISCONTINUED | OUTPATIENT
Start: 2021-03-23 | End: 2021-03-23 | Stop reason: HOSPADM

## 2021-03-23 RX ORDER — FLUMAZENIL 0.1 MG/ML
0.2 INJECTION, SOLUTION INTRAVENOUS
Status: DISCONTINUED | OUTPATIENT
Start: 2021-03-23 | End: 2021-03-23 | Stop reason: HOSPADM

## 2021-03-23 RX ORDER — PROPOFOL 10 MG/ML
INJECTION, EMULSION INTRAVENOUS PRN
Status: DISCONTINUED | OUTPATIENT
Start: 2021-03-23 | End: 2021-03-23

## 2021-03-23 RX ORDER — NALOXONE HYDROCHLORIDE 0.4 MG/ML
0.4 INJECTION, SOLUTION INTRAMUSCULAR; INTRAVENOUS; SUBCUTANEOUS
Status: DISCONTINUED | OUTPATIENT
Start: 2021-03-23 | End: 2021-03-23 | Stop reason: HOSPADM

## 2021-03-23 RX ORDER — LIDOCAINE 40 MG/G
CREAM TOPICAL
Status: DISCONTINUED | OUTPATIENT
Start: 2021-03-23 | End: 2021-03-23 | Stop reason: HOSPADM

## 2021-03-23 RX ORDER — FENTANYL CITRATE 50 UG/ML
INJECTION, SOLUTION INTRAMUSCULAR; INTRAVENOUS PRN
Status: DISCONTINUED | OUTPATIENT
Start: 2021-03-23 | End: 2021-03-23

## 2021-03-23 RX ORDER — SODIUM CHLORIDE, SODIUM LACTATE, POTASSIUM CHLORIDE, CALCIUM CHLORIDE 600; 310; 30; 20 MG/100ML; MG/100ML; MG/100ML; MG/100ML
INJECTION, SOLUTION INTRAVENOUS CONTINUOUS
Status: DISCONTINUED | OUTPATIENT
Start: 2021-03-23 | End: 2021-03-23 | Stop reason: HOSPADM

## 2021-03-23 RX ORDER — ONDANSETRON 2 MG/ML
INJECTION INTRAMUSCULAR; INTRAVENOUS PRN
Status: DISCONTINUED | OUTPATIENT
Start: 2021-03-23 | End: 2021-03-23

## 2021-03-23 RX ORDER — SODIUM CHLORIDE, SODIUM LACTATE, POTASSIUM CHLORIDE, CALCIUM CHLORIDE 600; 310; 30; 20 MG/100ML; MG/100ML; MG/100ML; MG/100ML
INJECTION, SOLUTION INTRAVENOUS CONTINUOUS PRN
Status: DISCONTINUED | OUTPATIENT
Start: 2021-03-23 | End: 2021-03-23

## 2021-03-23 RX ORDER — HYDROMORPHONE HYDROCHLORIDE 1 MG/ML
.3-.5 INJECTION, SOLUTION INTRAMUSCULAR; INTRAVENOUS; SUBCUTANEOUS EVERY 10 MIN PRN
Status: DISCONTINUED | OUTPATIENT
Start: 2021-03-23 | End: 2021-03-23 | Stop reason: HOSPADM

## 2021-03-23 RX ORDER — OXYCODONE AND ACETAMINOPHEN 5; 325 MG/1; MG/1
1 TABLET ORAL ONCE
Status: COMPLETED | OUTPATIENT
Start: 2021-03-23 | End: 2021-03-23

## 2021-03-23 RX ORDER — MEPERIDINE HYDROCHLORIDE 25 MG/ML
12.5 INJECTION INTRAMUSCULAR; INTRAVENOUS; SUBCUTANEOUS
Status: CANCELLED | OUTPATIENT
Start: 2021-03-23

## 2021-03-23 RX ORDER — INDOMETHACIN 50 MG/1
100 SUPPOSITORY RECTAL
Status: COMPLETED | OUTPATIENT
Start: 2021-03-23 | End: 2021-03-23

## 2021-03-23 RX ORDER — NALOXONE HYDROCHLORIDE 0.4 MG/ML
0.2 INJECTION, SOLUTION INTRAMUSCULAR; INTRAVENOUS; SUBCUTANEOUS
Status: DISCONTINUED | OUTPATIENT
Start: 2021-03-23 | End: 2021-03-23 | Stop reason: HOSPADM

## 2021-03-23 RX ORDER — LIDOCAINE HYDROCHLORIDE 20 MG/ML
INJECTION, SOLUTION INFILTRATION; PERINEURAL PRN
Status: DISCONTINUED | OUTPATIENT
Start: 2021-03-23 | End: 2021-03-23

## 2021-03-23 RX ORDER — FENTANYL CITRATE 50 UG/ML
25-50 INJECTION, SOLUTION INTRAMUSCULAR; INTRAVENOUS EVERY 5 MIN PRN
Status: CANCELLED | OUTPATIENT
Start: 2021-03-23

## 2021-03-23 RX ORDER — ONDANSETRON 2 MG/ML
4 INJECTION INTRAMUSCULAR; INTRAVENOUS EVERY 30 MIN PRN
Status: DISCONTINUED | OUTPATIENT
Start: 2021-03-23 | End: 2021-03-23 | Stop reason: HOSPADM

## 2021-03-23 RX ORDER — LEVOFLOXACIN 5 MG/ML
INJECTION, SOLUTION INTRAVENOUS PRN
Status: DISCONTINUED | OUTPATIENT
Start: 2021-03-23 | End: 2021-03-23

## 2021-03-23 RX ORDER — DEXAMETHASONE SODIUM PHOSPHATE 4 MG/ML
INJECTION, SOLUTION INTRA-ARTICULAR; INTRALESIONAL; INTRAMUSCULAR; INTRAVENOUS; SOFT TISSUE PRN
Status: DISCONTINUED | OUTPATIENT
Start: 2021-03-23 | End: 2021-03-23

## 2021-03-23 RX ORDER — EPHEDRINE SULFATE 50 MG/ML
INJECTION, SOLUTION INTRAMUSCULAR; INTRAVENOUS; SUBCUTANEOUS PRN
Status: DISCONTINUED | OUTPATIENT
Start: 2021-03-23 | End: 2021-03-23

## 2021-03-23 RX ADMIN — PROPOFOL 150 MG: 10 INJECTION, EMULSION INTRAVENOUS at 15:02

## 2021-03-23 RX ADMIN — SUGAMMADEX 200 MG: 100 INJECTION, SOLUTION INTRAVENOUS at 17:11

## 2021-03-23 RX ADMIN — MIDAZOLAM 2 MG: 1 INJECTION INTRAMUSCULAR; INTRAVENOUS at 14:51

## 2021-03-23 RX ADMIN — SODIUM CHLORIDE, POTASSIUM CHLORIDE, SODIUM LACTATE AND CALCIUM CHLORIDE: 600; 310; 30; 20 INJECTION, SOLUTION INTRAVENOUS at 14:56

## 2021-03-23 RX ADMIN — PHENYLEPHRINE HYDROCHLORIDE 200 MCG: 10 INJECTION INTRAVENOUS at 16:15

## 2021-03-23 RX ADMIN — OXYCODONE HYDROCHLORIDE AND ACETAMINOPHEN 1 TABLET: 5; 325 TABLET ORAL at 18:44

## 2021-03-23 RX ADMIN — FENTANYL CITRATE 50 MCG: 50 INJECTION, SOLUTION INTRAMUSCULAR; INTRAVENOUS at 16:49

## 2021-03-23 RX ADMIN — PROCHLORPERAZINE EDISYLATE 5 MG: 5 INJECTION INTRAMUSCULAR; INTRAVENOUS at 18:01

## 2021-03-23 RX ADMIN — HYDROMORPHONE HYDROCHLORIDE 0.5 MG: 1 INJECTION, SOLUTION INTRAMUSCULAR; INTRAVENOUS; SUBCUTANEOUS at 18:25

## 2021-03-23 RX ADMIN — FENTANYL CITRATE 50 MCG: 50 INJECTION, SOLUTION INTRAMUSCULAR; INTRAVENOUS at 15:02

## 2021-03-23 RX ADMIN — LEVOFLOXACIN 500 MG: 5 INJECTION, SOLUTION INTRAVENOUS at 15:21

## 2021-03-23 RX ADMIN — Medication 5 MG: at 16:18

## 2021-03-23 RX ADMIN — PHENYLEPHRINE HYDROCHLORIDE 100 MCG: 10 INJECTION INTRAVENOUS at 16:09

## 2021-03-23 RX ADMIN — Medication 10 MG: at 15:18

## 2021-03-23 RX ADMIN — PHENYLEPHRINE HYDROCHLORIDE 100 MCG: 10 INJECTION INTRAVENOUS at 15:21

## 2021-03-23 RX ADMIN — HUMAN SECRETIN 0.2 MCG: 16 INJECTION, POWDER, LYOPHILIZED, FOR SOLUTION INTRAVENOUS at 15:59

## 2021-03-23 RX ADMIN — ONDANSETRON 4 MG: 2 INJECTION INTRAMUSCULAR; INTRAVENOUS at 16:32

## 2021-03-23 RX ADMIN — FENTANYL CITRATE 50 MCG: 50 INJECTION, SOLUTION INTRAMUSCULAR; INTRAVENOUS at 15:42

## 2021-03-23 RX ADMIN — HUMAN SECRETIN 12.06 MCG: 16 INJECTION, POWDER, LYOPHILIZED, FOR SOLUTION INTRAVENOUS at 16:03

## 2021-03-23 RX ADMIN — PHENYLEPHRINE HYDROCHLORIDE 100 MCG: 10 INJECTION INTRAVENOUS at 15:51

## 2021-03-23 RX ADMIN — HYDROMORPHONE HYDROCHLORIDE 0.2 MG: 1 INJECTION, SOLUTION INTRAMUSCULAR; INTRAVENOUS; SUBCUTANEOUS at 18:13

## 2021-03-23 RX ADMIN — ROCURONIUM BROMIDE 50 MG: 10 INJECTION INTRAVENOUS at 15:03

## 2021-03-23 RX ADMIN — ONDANSETRON 4 MG: 2 INJECTION INTRAMUSCULAR; INTRAVENOUS at 17:41

## 2021-03-23 RX ADMIN — LIDOCAINE HYDROCHLORIDE 60 MG: 20 INJECTION, SOLUTION INFILTRATION; PERINEURAL at 15:02

## 2021-03-23 RX ADMIN — DEXAMETHASONE SODIUM PHOSPHATE 6 MG: 4 INJECTION, SOLUTION INTRA-ARTICULAR; INTRALESIONAL; INTRAMUSCULAR; INTRAVENOUS; SOFT TISSUE at 15:19

## 2021-03-23 RX ADMIN — GLUCAGON HYDROCHLORIDE 0.4 MG: KIT at 16:23

## 2021-03-23 RX ADMIN — PHENYLEPHRINE HYDROCHLORIDE 100 MCG: 10 INJECTION INTRAVENOUS at 15:39

## 2021-03-23 RX ADMIN — HYDROMORPHONE HYDROCHLORIDE 0.3 MG: 1 INJECTION, SOLUTION INTRAMUSCULAR; INTRAVENOUS; SUBCUTANEOUS at 18:02

## 2021-03-23 ASSESSMENT — LIFESTYLE VARIABLES: TOBACCO_USE: 1

## 2021-03-23 ASSESSMENT — MIFFLIN-ST. JEOR: SCORE: 1267.13

## 2021-03-23 NOTE — DISCHARGE INSTRUCTIONS
You had a medication called methaylene blue, this will make your urine green for the next couple of days.   [facility] [place] [facility]   Adult Discharge Orders & Instructions   For 24 hours after surgery    1. Get plenty of rest.  A responsible adult must stay with you for at least 24 hours after you leave the hospital.   2. Do not drive or use heavy equipment.  If you have weakness or tingling, don't drive or use heavy equipment until this feeling goes away.  3. Do not drink alcohol.  4. Avoid strenuous or risky activities.  Ask for help when climbing stairs.   5. You may feel lightheaded.  IF so, sit for a few minutes before standing.  Have someone help you get up.   6. If you have nausea (feel sick to your stomach): Drink only clear liquids such as apple juice, ginger ale, broth or 7-Up.  Rest may also help.  Be sure to drink enough fluids.  Move to a regular diet as you feel able.  7. You may have a slight fever. Call the doctor if your fever is over 100 F (37.7 C) (taken under the tongue) or lasts longer than 24 hours.  8. You may have a dry mouth, a sore throat, muscle aches or trouble sleeping.  These should go away after 24 hours.  9. Do not make important or legal decisions.   Call your doctor for any of the followin.  Signs of infection (fever, growing tenderness at the surgery site, a large amount of drainage or bleeding, severe pain, foul-smelling drainage, redness, swelling).    2. It has been over 8 to 10 hours since surgery and you are still not able to urinate (pass water).    3.  Headache for over 24 hours.    To contact a doctor, call Dr Ashford's clinic at [phone] or:     X [phone] and ask for the resident on call for gastroenterology (answered 24 hours a day)   X Emergency Department: [facility]: [phone]       (TTY for hearing impaired: [phone])

## 2021-03-23 NOTE — ANESTHESIA POSTPROCEDURE EVALUATION
Patient: Keerthi Montemayor    Procedure(s):  ENDOSCOPIC RETROGRADE CHOLANGIOPANCREATOGRAPHY, WITH PANCREATIC DUCT DILATION AND STENT PLACEMENT X 2 , BILE DUCT STENT PLACEMENT X 1, MINOR PAPILLOTOMY, BILIARY   SPHINCTEROTOMY    Diagnosis:Pancreatic stones [K86.89]  Diagnosis Additional Information: No value filed.    Anesthesia Type:  General    Note:  Disposition: Outpatient   Postop Pain Control: Uneventful            Sign Out: Well controlled pain   PONV: No   Neuro/Psych: Uneventful            Sign Out: Acceptable/Baseline neuro status   Airway/Respiratory: Uneventful            Sign Out: Acceptable/Baseline resp. status   CV/Hemodynamics: Uneventful            Sign Out: Acceptable CV status   Other NRE:    DID A NON-ROUTINE EVENT OCCUR?          Last vitals:  Vitals:    03/23/21 1745 03/23/21 1800 03/23/21 1815   BP: 127/55 133/68 130/68   Pulse: 61 64 65   Resp: 16 17 15   Temp:      SpO2: 100% 100% 99%       Last vitals prior to Anesthesia Care Transfer:  CRNA VITALS  3/23/2021 1656 - 3/23/2021 1756      3/23/2021             Pulse:  95    SpO2:  100 %          Electronically Signed By: Meghan Packer MD  March 23, 2021  6:59 PM

## 2021-03-23 NOTE — ANESTHESIA PROCEDURE NOTES
Airway       Patient location during procedure: OR  Staff -        Other Anesthesia Staff: Carie Cummins       Performed By: SRNA  Consent for Airway        Urgency: elective  Indications and Patient Condition       Indications for airway management: alan-procedural       Induction type:intravenous       Mask difficulty assessment: 2 - vent by mask + OA or adjuvant +/- NMBA    Final Airway Details       Final airway type: endotracheal airway       Successful airway: ETT - single  Endotracheal Airway Details        ETT size (mm): 7.0       Cuffed: yes       Cuff volume (mL): 10       Successful intubation technique: direct laryngoscopy       DL Blade Type: Tomas 2       Grade View of Cords: 2       Adjucts: stylet       Position: Right       Measured from: lips       Secured at (cm): 21       Bite block used: None    Post intubation assessment        Placement verified by: capnometry, equal breath sounds and chest rise        Number of attempts at approach: 1       Secured with: pink tape       Ease of procedure: easy       Dentition: Intact and Unchanged    Medication(s) Administered   Medication Administration Time: 3/23/2021 3:06 PM

## 2021-03-23 NOTE — ANESTHESIA CARE TRANSFER NOTE
Patient: Keerthi Montemayor    Procedure(s):  ENDOSCOPIC RETROGRADE CHOLANGIOPANCREATOGRAPHY, WITH PANCREATIC DUCT DILATION AND STENT PLACEMENT X 2 , BILE DUCT STENT PLACEMENT X 1, MINOR PAPILLOTOMY, BILLARIAY SPHINCTEROTOMY    Diagnosis: Pancreatic stones [K86.89]  Diagnosis Additional Information: No value filed.    Anesthesia Type:   General     Note:    Oropharynx: oropharynx clear of all foreign objects  Level of Consciousness: awake  Oxygen Supplementation: nasal cannula  Level of Supplemental Oxygen (L/min / FiO2): 4  Independent Airway: airway patency satisfactory and stable  Dentition: dentition unchanged  Vital Signs Stable: post-procedure vital signs reviewed and stable  Report to RN Given: handoff report given  Patient transferred to: PACU  Comments: Anesthesia Care Transfer Note      Transfer to:  PACU    Patient Vital Signs:  Stable    Airway:  None    Patient transported to PACU with supplemental O2.  Patient alert and breathing comfortably.  VSS.  Care transferred with report to PACU RN.    Rubio Turcios CRNA        Vitals: (Last set prior to Anesthesia Care Transfer)  CRNA VITALS  3/23/2021 1656 - 3/23/2021 1729      3/23/2021             Pulse:  95    SpO2:  100 %        Electronically Signed By: ROSARIO Cisneros CRNA  March 23, 2021  5:29 PM

## 2021-03-24 LAB — ERCP: NORMAL

## 2021-03-26 ENCOUNTER — PREP FOR PROCEDURE (OUTPATIENT)
Dept: GASTROENTEROLOGY | Facility: CLINIC | Age: 41
End: 2021-03-26

## 2021-03-26 ENCOUNTER — CARE COORDINATION (OUTPATIENT)
Dept: GASTROENTEROLOGY | Facility: CLINIC | Age: 41
End: 2021-03-26

## 2021-03-26 DIAGNOSIS — K86.1 CHRONIC RECURRENT PANCREATITIS (H): Primary | ICD-10-CM

## 2021-03-26 NOTE — PROGRESS NOTES
"Post ERCP On 3/23/21    Refer to [facility] for pain control.     - Schedule ERCP with Dr. Ashford in OR in 4-6 weeks for  pancreatic duct stent exchange and PD stone removal.     Please assist in scheduling:     Procedure/Imaging/Clinic: ERCP  Physician: Dr. Ashford  Timin21  Procedure length:90 min  Anesthesia:general  Dx: chronic recurrent pancreatitis  Tier:3  Location: [facility]     Called pt, she's feeling ok after procedure, \"ate too much last night\",  work for procedure and knows of pain mgmt referral.    ML     "

## 2021-03-31 ENCOUNTER — MYC MEDICAL ADVICE (OUTPATIENT)
Dept: ANESTHESIOLOGY | Facility: CLINIC | Age: 41
End: 2021-03-31

## 2021-03-31 ENCOUNTER — VIRTUAL VISIT (OUTPATIENT)
Dept: ANESTHESIOLOGY | Facility: CLINIC | Age: 41
End: 2021-03-31
Attending: INTERNAL MEDICINE
Payer: COMMERCIAL

## 2021-03-31 DIAGNOSIS — K86.1 CHRONIC RECURRENT PANCREATITIS (H): ICD-10-CM

## 2021-03-31 PROCEDURE — 99204 OFFICE O/P NEW MOD 45 MIN: CPT | Mod: GT | Performed by: ANESTHESIOLOGY

## 2021-03-31 RX ORDER — HYOSCYAMINE SULFATE 0.125 MG
0.12 TABLET ORAL EVERY 4 HOURS PRN
Qty: 120 TABLET | Refills: 2 | Status: SHIPPED | OUTPATIENT
Start: 2021-03-31 | End: 2021-06-11

## 2021-03-31 RX ORDER — HYDROMORPHONE HYDROCHLORIDE 2 MG/1
2 TABLET ORAL EVERY 6 HOURS PRN
COMMUNITY
Start: 2021-03-29 | End: 2021-04-05

## 2021-03-31 ASSESSMENT — PAIN SCALES - GENERAL: PAINLEVEL: SEVERE PAIN (6)

## 2021-03-31 NOTE — PATIENT INSTRUCTIONS
Medications:    hyoscyamine (LEVSIN) 0.125 MG tablet- Take 1 tablet (125 mcg) by mouth every 4 hours as needed for cramping    Please provide the clinic with a minium of 1 week notice, on all prescription refills.     Referrals:    Acupuncture referral placed-   -Please call your insurance provider to find out about acupuncture coverage, being that not all policies cover acupuncture services.      Treatment planning:    TENS UNIT Ordered- Please call your insurance to Verify coverage and locations to  the device.       Recommended Follow up:      3-4 months.        Please call [phone], option #1 to schedule your clinic appointment if you don't already have an appointment scheduled.        To speak with a nurse, schedule/reschedule/cancel a clinic appointment, or request a medication refill call: [phone], option #1.    You can also reach us by [employer]: [email or web address]

## 2021-03-31 NOTE — LETTER
3/31/2021       RE: Keerthi Montemayor  [address]  [address]  [place] [address]     Dear Colleague,    Thank you for referring your patient, Keerthi Montemayor, to the [facility] [place] at [facility]. Please see a copy of my visit note below.    Keerthi is a 40 year old who is being evaluated via a billable video visit.      How would you like to obtain your AVS? MyChart  If the video visit is dropped, the invitation should be resent by: Send to e-mail at: [email or web address]  Will anyone else be joining your video visit? No      Meghan Smart, RITA      Keerthi is a 40 year old who is being evaluated via a billable video visit.      How would you like to obtain your AVS? MyChart  If the video visit is dropped, the invitation should be resent by: Text to cell phone: see chart  Will anyone else be joining your video visit? No      Video-Visit Details    Type of service:  Video Visit    Video Start Time: 10:24 AM      Video Duration 40 minutes    Originating Location (pt. Location): Home    Distant Location (provider location):  [facility] [place]     Platform used for Video Visit: [employer]                          [facility] Consultation    Date of visit: 3/31/2021    Reason for consultation:    Keerthi Montemayor is a 40 year old female who is seen in consultation today at the request of her provider, Dr Ashford.    Primary Care Provider is Erica Alexis  Pain medications are being prescribed by PCP/Gastroenterol.    Please see the [facility] health questionnaire which the patient completed and reviewed with me in detail.    Chief Complaint:    Chief Complaint   Patient presents with     Pain Management     New consult       Pain history:  Keerthi Montemayor is a 40 year old female who first started having problems with pain in her  abdomen. She states that she has had chronic pancreatitis for approximately one year, especially symptoms worsened since her ERCP approximately one week ago.  She describes pain on the upper right side. It is a constant stabbing pain as well as an involuntary spasming pain. The stabbing pain comes and goes and is a 6/10 as opposed to the constant pain that is a 4/10.  She states the pain migrates to her midback on both sides. The migrating pain is more recent.     Gets pancreatitis flare about 1 x per month and lasts for up to 2 weeks. Avoiding food helps to decrease duration of flare to 7 days.     Pain rating: intensity ranges from 3/10 to 7/10, and Averages 5/10 on a 0-10 scale.  Aggravating factors include: movement, activity, long periods of walking, bending, lifting  Relieving factors include: laying around; resting makes the pain not bothersome  Any bowel or bladder incontinence: denies; constipation with pain killers, but otherwise no issues    Current treatments include:  Gabapentin 100 mg TID (previously 300 mg TID, but new provider refilled with 100 mg tabs which is not helping)      Hydromorphone 2 mg Q6H PRN - takes 5 tabs per day (2 first thing in the morning); has been taking since Monday, has also taken this in November with last case of pancreatitis  (rx for 7 days after ERCP)     Previous medication treatments included:  none    Other treatments have included:  Keerthi MERLIN Montemayor has not been seen at a pain clinic in the past.    PT: no  Acupuncture: no, very interested  TENs Unit: no  Injections: no, very interested    Past Medical History:  Past Medical History:   Diagnosis Date     Gastroesophageal reflux disease      Pancreatic disease      Patient Active Problem List    Diagnosis Date Noted     Pancreatic stones 02/17/2021     Priority: Medium     Added automatically from request for surgery [id number]       Esophageal reflux 03/09/2006     Priority: Medium     Other psoriasis 03/09/2006      Priority: Medium       Past Surgical History:  Past Surgical History:   Procedure Laterality Date     ENDOSCOPIC RETROGRADE CHOLANGIOPANCREATOGRAM N/A 3/23/2021    Procedure: ENDOSCOPIC RETROGRADE CHOLANGIOPANCREATOGRAPHY, WITH PANCREATIC DUCT DILATION AND STENT PLACEMENT X 2 , BILE DUCT STENT PLACEMENT X 1, MINOR PAPILLOTOMY, BILIARY   SPHINCTEROTOMY;  Surgeon: Janes Ashford MD;  Location: [facility]      COLONOSCOPY THRU STOMA, DIAGNOSTIC      done for IBS, NORMAL exam     NO HISTORY OF SURGERY       Medications:  Current Outpatient Medications   Medication Sig Dispense Refill     alum & mag hydroxide-simethicone (MAALOX) 200-200-20 MG/5ML SUSP suspension Take 30 mLs by mouth       atenolol (TENORMIN) 25 MG tablet Take 25 mg by mouth       esomeprazole (NEXIUM) 20 MG DR capsule Take 20 mg by mouth       FLUoxetine (PROZAC) 20 MG capsule Take 20 mg by mouth       FLUoxetine (PROZAC) 40 MG capsule Take 40 mg by mouth       gabapentin (NEURONTIN) 100 MG capsule Take 100 mg by mouth 3 times daily Takes as needed for heartburn/GI/abdominal pain per pt.       HYDROmorphone (DILAUDID) 2 MG tablet Take 2 mg by mouth every 6 hours as needed        lidocaine (XYLOCAINE) 2 % solution Take 15 mLs by mouth       ondansetron (ZOFRAN-ODT) 8 MG ODT tab DISSOLVE 1 TABLET IN MOUTH TWICE DAILY       prochlorperazine (COMPAZINE) 10 MG tablet Take 10 mg by mouth       triamcinolone (ARISTOCORT HP) 0.5 % external cream APPLY 1 APPLICATION TOPICALLY TWO TIMES A DAY. 2 WEEKS PER EPISODE       Allergies:     Allergies   Allergen Reactions     Penicillins Hives     Amoxicillin Hives     Social History:  Home situation: Lives with cat  Occupation/Schooling: [employer]  Tobacco use: currently trying to quit  Alcohol use: denies, abstinent for 2 years  Drug use: Marijuana, helps a lot with abdominal pain and spasms  History of chemical dependency treatment: 2 years    Family history:  Family History   Problem Relation Age of  Onset     Diabetes Maternal Aunt      LEIGH. Paternal Grandfather          age 73     Cancer Maternal Grandfather         lung cancer       Review of Systems:    POSTIVE IN BOLD  GENERAL: fever/chills, fatigue, general unwell feeling, weight gain/loss.  HEAD/EYES:  headache, dizziness, or vision changes.    EARS/NOSE/THROAT:  Nosebleeds, hearing loss, sinus infection, earache, tinnitus.  IMMUNE:  Allergies, cancer, immune deficiency, or infections.  SKIN:  Urticaria, rash, hives  HEME/Lymphatic:   anemia, easy bruising, easy bleeding.  RESPIRATORY:  cough, wheezing, or shortness of breath  CARDIOVASCULAR/Circulation:  Extremity edema, syncope, hypertension, tachycardia, or angina.  GASTROINTESTINAL:  abdominal pain, nausea/emesis, diarrhea, constipation,  hematochezia, or melena.  ENDOCRINE:  Diabetes, steroid use,  thyroid disease or osteoporosis.  MUSCULOSKELETAL: neck pain, back pain, arthralgia, arthritis, or gout.  GENITOURINARY:  frequency, urgency, dysuria, difficulty voiding, hematuria or incontinence.  NEUROLOGIC:  weakness, numbness, paresthesias, seizure, tremor, stroke or memory loss.  PSYCHIATRIC:  depression, anxiety, stress, suicidal thoughts or mood swings.     Objective     Vitals - Patient Reported  Pain Score: Severe Pain (6)  Pain Loc: (URQ)    Physical Exam   GENERAL: Healthy, alert and no distress  EYES: Eyes grossly normal to inspection.  No discharge or erythema, or obvious scleral/conjunctival abnormalities.  RESP: No audible wheeze, cough, or visible cyanosis.  No visible retractions or increased work of breathing.    SKIN: Visible skin clear. No significant rash, abnormal pigmentation or lesions.  NEURO: Cranial nerves grossly intact.  Mentation and speech appropriate for age.  PSYCH: Mentation appears normal, affect normal/bright, judgement and insight intact, normal speech and appearance well-groomed.      [place] Prescription Monitoring Program reviewed     Outside records reviewed via  [facility]    Assessment:  1. Chronic Abdominal Pain    Keerthi Monetmayor is a 40 year old female who presents with the complaints of chronic abdominal pain for which she is seeking advice on comprehensive management options. The plan is outlined below.     Plan:  Diagnosis reviewed, treatment option addressed, and risk/benefits discussed.  Self-care instructions given.  I am recommending a multidisciplinary treatment plan to help this patient better manage her pain.      PLANS: Chronic Pancreatitis  Recommend that PCP increase Gabapentin to 300 mg TID and can even increase bedtime dose to 600 mg if tolerated  Rx for Hyoscyamine provided for abdominal cramping  Referral for Acupuncture  Rx for TENS unit  Will consider potential interventions in the future.   F/U 3 to 4 months    Total time spent was 50 minutes, and more than 50% of face to face time was spent in counseling and/or coordination of care regarding principles of multidisciplinary care, medication management, and therapeutic options as well as chart review and preparation prior to the visit.     Eleanor Hawthorne MD    Pain Medicine  Department of Anesthesiology  [facility]      AVS sent to the pt's MyChart.   Eliza Durant LPN

## 2021-03-31 NOTE — PROGRESS NOTES
Keertih is a 40 year old who is being evaluated via a billable video visit.      How would you like to obtain your AVS? MyChart  If the video visit is dropped, the invitation should be resent by: Send to e-mail at: [email or web address]  Will anyone else be joining your video visit? Michelle Smart CMA

## 2021-04-01 PROBLEM — K86.1 CHRONIC RECURRENT PANCREATITIS (H): Status: ACTIVE | Noted: 2021-04-01

## 2021-04-10 ENCOUNTER — HEALTH MAINTENANCE LETTER (OUTPATIENT)
Age: 41
End: 2021-04-10

## 2021-04-13 ENCOUNTER — PATIENT OUTREACH (OUTPATIENT)
Dept: GASTROENTEROLOGY | Facility: CLINIC | Age: 41
End: 2021-04-13

## 2021-04-13 DIAGNOSIS — Z11.59 ENCOUNTER FOR SCREENING FOR OTHER VIRAL DISEASES: ICD-10-CM

## 2021-04-13 NOTE — TELEPHONE ENCOUNTER
Patients PCP called asking that we call pt to expalin her recently ordered TENS unit. Pain mgmt ordered TENS unit. Called pt, left message with # to pain clinic for further discussion.    ML

## 2021-04-23 ENCOUNTER — THERAPY VISIT (OUTPATIENT)
Dept: PHYSICAL THERAPY | Facility: CLINIC | Age: 41
End: 2021-04-23
Attending: ANESTHESIOLOGY
Payer: COMMERCIAL

## 2021-04-23 DIAGNOSIS — K86.1 CHRONIC RECURRENT PANCREATITIS (H): ICD-10-CM

## 2021-04-23 DIAGNOSIS — Z11.59 ENCOUNTER FOR SCREENING FOR OTHER VIRAL DISEASES: ICD-10-CM

## 2021-04-23 PROCEDURE — 97530 THERAPEUTIC ACTIVITIES: CPT | Mod: GP | Performed by: PHYSICAL THERAPIST

## 2021-04-23 PROCEDURE — 97161 PT EVAL LOW COMPLEX 20 MIN: CPT | Mod: GP | Performed by: PHYSICAL THERAPIST

## 2021-04-23 PROCEDURE — U0003 INFECTIOUS AGENT DETECTION BY NUCLEIC ACID (DNA OR RNA); SEVERE ACUTE RESPIRATORY SYNDROME CORONAVIRUS 2 (SARS-COV-2) (CORONAVIRUS DISEASE [COVID-19]), AMPLIFIED PROBE TECHNIQUE, MAKING USE OF HIGH THROUGHPUT TECHNOLOGIES AS DESCRIBED BY CMS-2020-01-R: HCPCS | Performed by: INTERNAL MEDICINE

## 2021-04-23 PROCEDURE — U0005 INFEC AGEN DETEC AMPLI PROBE: HCPCS | Performed by: INTERNAL MEDICINE

## 2021-04-23 PROCEDURE — 97014 ELECTRIC STIMULATION THERAPY: CPT | Performed by: PHYSICAL THERAPIST

## 2021-04-23 NOTE — PROGRESS NOTES
Physical Therapy Initial Evaluation  Subjective:    Patient Health History  Keerthi Montemayor being seen for pancreatic pain.     Problem began: 11/1/2020.   Problem occurred: drinking   Pain is reported as 6/10 on pain scale.  General health as reported by patient is good.  Pertinent medical history includes: smoking.   Red flags:  None as reported by patient.   Other medical allergies details: amoxicillin.   Surgeries include:  Other. Other surgery history details: ERCP.    Current medications:  Anti-depressants and high blood pressure medication.    Current occupation is sales.   Primary job tasks include:  Computer work, lifting/carrying, prolonged standing, pushing/pulling and repetitive tasks.                                Pt presents to PT clinic with order for 1 time consult for TENS unit instruction for chronic recurrent pancreatitis resulting in chronic B low back pain. Time devoted to her new personal TENS unit and safe operation, formal lumbar evaluation was deferred including AROM, MMT, neurological exam. PT instructed on setup of device warnings, precautions, provided demonstration of program using pads and placing battery in device. Additional pads and 9v batteries provided. Pt was able to perform a brief program on her L anterior forearm and PT then setup 10min program to low back in prone. Post session patient without further questions or concerns. Clinic information provided if further questions arise and plan to discharge episode of care.

## 2021-04-26 ENCOUNTER — ANESTHESIA EVENT (OUTPATIENT)
Dept: SURGERY | Facility: CLINIC | Age: 41
End: 2021-04-26
Payer: COMMERCIAL

## 2021-04-26 ASSESSMENT — LIFESTYLE VARIABLES: TOBACCO_USE: 1

## 2021-04-26 NOTE — BRIEF OP NOTE
[facility] Brief Operative Note    Pre-operative diagnosis: Chronic recurrent pancreatitis (H) [K86.1]   Post-operative diagnosis * No post-op diagnosis entered *   Procedure: Procedure(s):  ENDOSCOPIC RETROGRADE CHOLANGIOPANCREATOGRAPHY,WITH PANCRETIC DUCT STONE REMOVAL, AND PLACEMENT OF STENTS   Surgeon: Janes Ashford MD   Assistants(s): Raymond Bautista MD   Estimated blood loss: None    Specimens: None       Findings:  - Previously-placed dorsal pancreatic duct stents had spontaneously migrated (excreted).  - Dorsal pancreatogram w/ filling defect in the genu, with upstream MPD dilation to 6-8 mm.  - Extraction of MPD stone fragments with basket and balloon catheter.  - Deployment of transpapillary (minor) Zimmon 4-Fr x 8-cm single pigtail stent + Zimmon 4-Fr x 6-cm single pigtail stent.    Recommendations:  - Observe in PACU and discharge.  - Prescribed Percocet to discharge pharmacy.    Raymond Bautista MD on 4/27/2021 at 10:19 AM    History:  41 yo F w/ h/o recurrent acute-on-chronic pancreatitis (etiology: EtOH + tobacco] x8-yrs). S/P EUS (12/11/20; Dr. Colmenares) w/ likely pancreas divisum w/ dorsal duct stones. No prior clinical response to PERT. CT/MRI w/ diffusely dilated dorsal duct down to minor papilla, w/ 5-6mm intraductal stone near minor papilla; large cluster of stones in ventral duct; pancreas divisum vs pseudo-divisum. S/P index ERCP w/ traction-type minor papillotomy + dorsal duct debris extraction (stone not extracted) + Advanix 3-Fr stents (x2) + biliary sphincterotomy + self-ejecting biliary stent (3/23/21). Today (4/27/21), patient presents for 2nd ERCP w/ attempted dorsal duct stone removal.

## 2021-04-26 NOTE — ANESTHESIA PREPROCEDURE EVALUATION
Anesthesia Pre-Procedure Evaluation    Patient: Keerthi Montemayor   MRN: [id number] : 1980        Preoperative Diagnosis: Chronic recurrent pancreatitis (H) [K86.1]   Procedure : Procedure(s):  ENDOSCOPIC RETROGRADE CHOLANGIOPANCREATOGRAPHY     Past Medical History:   Diagnosis Date     Gastroesophageal reflux disease      Pancreatic disease       Past Surgical History:   Procedure Laterality Date     ENDOSCOPIC RETROGRADE CHOLANGIOPANCREATOGRAM N/A 3/23/2021    Procedure: ENDOSCOPIC RETROGRADE CHOLANGIOPANCREATOGRAPHY, WITH PANCREATIC DUCT DILATION AND STENT PLACEMENT X 2 , BILE DUCT STENT PLACEMENT X 1, MINOR PAPILLOTOMY, BILIARY   SPHINCTEROTOMY;  Surgeon: Janes Ashford MD;  Location: [facility] OR      COLONOSCOPY THRU STOMA, DIAGNOSTIC      done for IBS, NORMAL exam     NO HISTORY OF SURGERY        Allergies   Allergen Reactions     Penicillins Hives     Amoxicillin Hives      Social History     Tobacco Use     Smoking status: Current Every Day Smoker     Smokeless tobacco: Never Used   Substance Use Topics     Alcohol use: Not Currently     Comment: 6-7 beers daily      Wt Readings from Last 1 Encounters:   21 62.8 kg (138 lb 7.2 oz)        Anesthesia Evaluation   Pt has had prior anesthetic. Type: General and MAC.    No history of anesthetic complications       ROS/MED HX  ENT/Pulmonary:     (+) tobacco use, Current use, 1  Pack-Year Hx,      Neurologic:  - neg neurologic ROS     Cardiovascular:     (+) hypertension-----    METS/Exercise Tolerance: >4 METS    Hematologic:  - neg hematologic  ROS     Musculoskeletal:  - neg musculoskeletal ROS     GI/Hepatic: Comment: Chronic pancreatitis with pancreatic duct stones    Celiac disease    (+) GERD,     Renal/Genitourinary:       Endo:  - neg endo ROS     Psychiatric/Substance Use:     (+) psychiatric history anxiety alcohol abuse Recreational drug usage: Cannabis.    Infectious Disease:  - neg infectious disease ROS     Malignancy:  - neg  malignancy ROS     Other:            Physical Exam    Airway        Mallampati: II   TM distance: > 3 FB   Neck ROM: full   Mouth opening: > 3 cm    Respiratory Devices and Support         Dental           Cardiovascular   cardiovascular exam normal       Rhythm and rate: regular and normal     Pulmonary   pulmonary exam normal        breath sounds clear to auscultation           OUTSIDE LABS:  CBC:   Lab Results   Component Value Date    WBC 9.2 03/23/2021    WBC 11.1 (H) 05/31/2007    HGB 10.5 (L) 03/23/2021    HGB 13.4 05/31/2007    HCT 31.7 (L) 03/23/2021    HCT 37.8 05/31/2007     03/23/2021     (L) 05/31/2007     BMP:   Lab Results   Component Value Date     (L) 03/23/2021     05/31/2007    POTASSIUM 3.8 03/23/2021    POTASSIUM 3.5 05/31/2007    CHLORIDE 100 03/23/2021    CHLORIDE 106 05/31/2007    CO2 25 03/23/2021    CO2 24 05/31/2007    BUN 6 (L) 03/23/2021    BUN 6 05/31/2007    CR 0.62 03/23/2021    CR 0.66 05/31/2007    GLC 86 03/23/2021     (H) 05/31/2007     COAGS:   Lab Results   Component Value Date    INR 1.19 (H) 03/23/2021     POC:   Lab Results   Component Value Date    BGM 88 03/23/2021    HCG Negative 03/23/2021     HEPATIC:   Lab Results   Component Value Date    ALBUMIN 3.7 03/23/2021    PROTTOTAL 7.1 03/23/2021    ALT 19 03/23/2021    AST 8 03/23/2021    ALKPHOS 65 03/23/2021    BILITOTAL 0.5 03/23/2021     OTHER:   Lab Results   Component Value Date    PH 6.5 06/01/2006    HERMAN 8.8 03/23/2021    LIPASE 94 03/23/2021    AMYLASE 49 03/23/2021       Anesthesia Plan    ASA Status:  2      Anesthesia Type: General.   Induction: Intravenous.   Maintenance: Balanced.   Techniques and Equipment:     - Lines/Monitors: BIS     Consents    Anesthesia Plan(s) and associated risks, benefits, and realistic alternatives discussed. Questions answered and patient/representative(s) expressed understanding.     - Discussed with:  Patient         Postoperative Care    Pain  management: IV analgesics, Oral pain medications, Multi-modal analgesia.   PONV prophylaxis: Ondansetron (or other 5HT-3), Dexamethasone or Solumedrol     Comments:                Farhan Brown MD

## 2021-04-27 ENCOUNTER — ANESTHESIA (OUTPATIENT)
Dept: SURGERY | Facility: CLINIC | Age: 41
End: 2021-04-27
Payer: COMMERCIAL

## 2021-04-27 ENCOUNTER — HOSPITAL ENCOUNTER (OUTPATIENT)
Facility: CLINIC | Age: 41
Discharge: HOME | End: 2021-04-27
Attending: INTERNAL MEDICINE | Admitting: INTERNAL MEDICINE
Payer: COMMERCIAL

## 2021-04-27 ENCOUNTER — APPOINTMENT (OUTPATIENT)
Dept: GENERAL RADIOLOGY | Facility: CLINIC | Age: 41
End: 2021-04-27
Attending: INTERNAL MEDICINE
Payer: COMMERCIAL

## 2021-04-27 VITALS
TEMPERATURE: 97.2 F | HEART RATE: 58 BPM | RESPIRATION RATE: 18 BRPM | OXYGEN SATURATION: 98 % | HEIGHT: 63 IN | WEIGHT: 135.14 LBS | BODY MASS INDEX: 23.95 KG/M2 | SYSTOLIC BLOOD PRESSURE: 126 MMHG | DIASTOLIC BLOOD PRESSURE: 81 MMHG

## 2021-04-27 DIAGNOSIS — K86.1 CHRONIC RECURRENT PANCREATITIS (H): ICD-10-CM

## 2021-04-27 LAB
ALBUMIN SERPL-MCNC: 3.3 G/DL (ref 3.4–5)
ALP SERPL-CCNC: 110 U/L (ref 40–150)
ALT SERPL W P-5'-P-CCNC: 123 U/L (ref 0–50)
AMYLASE SERPL-CCNC: 88 U/L (ref 30–110)
ANION GAP SERPL CALCULATED.3IONS-SCNC: 5 MMOL/L (ref 3–14)
AST SERPL W P-5'-P-CCNC: 121 U/L (ref 0–45)
BILIRUB SERPL-MCNC: 0.4 MG/DL (ref 0.2–1.3)
BUN SERPL-MCNC: 9 MG/DL (ref 7–30)
CALCIUM SERPL-MCNC: 8.6 MG/DL (ref 8.5–10.1)
CHLORIDE SERPL-SCNC: 105 MMOL/L (ref 94–109)
CO2 SERPL-SCNC: 25 MMOL/L (ref 20–32)
CREAT SERPL-MCNC: 0.7 MG/DL (ref 0.52–1.04)
ERCP: NORMAL
ERYTHROCYTE [DISTWIDTH] IN BLOOD BY AUTOMATED COUNT: 14.8 % (ref 10–15)
GFR SERPL CREATININE-BSD FRML MDRD: >90 ML/MIN/{1.73_M2}
GLUCOSE BLDC GLUCOMTR-MCNC: 111 MG/DL (ref 70–99)
GLUCOSE SERPL-MCNC: 116 MG/DL (ref 70–99)
HCG UR QL: NEGATIVE
HCT VFR BLD AUTO: 33.6 % (ref 35–47)
HGB BLD-MCNC: 10.5 G/DL (ref 11.7–15.7)
INR PPP: 1.14 (ref 0.86–1.14)
LIPASE SERPL-CCNC: 40 U/L (ref 73–393)
MCH RBC QN AUTO: 25.4 PG (ref 26.5–33)
MCHC RBC AUTO-ENTMCNC: 31.3 G/DL (ref 31.5–36.5)
MCV RBC AUTO: 81 FL (ref 78–100)
PLATELET # BLD AUTO: 212 10E9/L (ref 150–450)
POTASSIUM SERPL-SCNC: 4 MMOL/L (ref 3.4–5.3)
PROT SERPL-MCNC: 6.7 G/DL (ref 6.8–8.8)
RBC # BLD AUTO: 4.13 10E12/L (ref 3.8–5.2)
SODIUM SERPL-SCNC: 135 MMOL/L (ref 133–144)
WBC # BLD AUTO: 5.4 10E9/L (ref 4–11)

## 2021-04-27 PROCEDURE — 250N000011 HC RX IP 250 OP 636: Performed by: ANESTHESIOLOGY

## 2021-04-27 PROCEDURE — 999N000181 XR SURGERY CARM FLUORO GREATER THAN 5 MIN W STILLS: Mod: TC

## 2021-04-27 PROCEDURE — C1726 CATH, BAL DIL, NON-VASCULAR: HCPCS | Performed by: INTERNAL MEDICINE

## 2021-04-27 PROCEDURE — 272N000001 HC OR GENERAL SUPPLY STERILE: Performed by: INTERNAL MEDICINE

## 2021-04-27 PROCEDURE — 85027 COMPLETE CBC AUTOMATED: CPT | Performed by: STUDENT IN AN ORGANIZED HEALTH CARE EDUCATION/TRAINING PROGRAM

## 2021-04-27 PROCEDURE — 250N000009 HC RX 250: Performed by: ANESTHESIOLOGY

## 2021-04-27 PROCEDURE — C1769 GUIDE WIRE: HCPCS | Performed by: INTERNAL MEDICINE

## 2021-04-27 PROCEDURE — 250N000025 HC SEVOFLURANE, PER MIN: Performed by: INTERNAL MEDICINE

## 2021-04-27 PROCEDURE — 250N000011 HC RX IP 250 OP 636: Performed by: STUDENT IN AN ORGANIZED HEALTH CARE EDUCATION/TRAINING PROGRAM

## 2021-04-27 PROCEDURE — 258N000003 HC RX IP 258 OP 636: Performed by: STUDENT IN AN ORGANIZED HEALTH CARE EDUCATION/TRAINING PROGRAM

## 2021-04-27 PROCEDURE — 82150 ASSAY OF AMYLASE: CPT | Performed by: STUDENT IN AN ORGANIZED HEALTH CARE EDUCATION/TRAINING PROGRAM

## 2021-04-27 PROCEDURE — 250N000013 HC RX MED GY IP 250 OP 250 PS 637: Performed by: ANESTHESIOLOGY

## 2021-04-27 PROCEDURE — 250N000009 HC RX 250: Performed by: STUDENT IN AN ORGANIZED HEALTH CARE EDUCATION/TRAINING PROGRAM

## 2021-04-27 PROCEDURE — 250N000009 HC RX 250: Performed by: INTERNAL MEDICINE

## 2021-04-27 PROCEDURE — 710N000010 HC RECOVERY PHASE 1, LEVEL 2, PER MIN: Performed by: INTERNAL MEDICINE

## 2021-04-27 PROCEDURE — 710N000012 HC RECOVERY PHASE 2, PER MINUTE: Performed by: INTERNAL MEDICINE

## 2021-04-27 PROCEDURE — 82962 GLUCOSE BLOOD TEST: CPT

## 2021-04-27 PROCEDURE — 83690 ASSAY OF LIPASE: CPT | Performed by: STUDENT IN AN ORGANIZED HEALTH CARE EDUCATION/TRAINING PROGRAM

## 2021-04-27 PROCEDURE — 370N000017 HC ANESTHESIA TECHNICAL FEE, PER MIN: Performed by: INTERNAL MEDICINE

## 2021-04-27 PROCEDURE — C1877 STENT, NON-COAT/COV W/O DEL: HCPCS | Performed by: INTERNAL MEDICINE

## 2021-04-27 PROCEDURE — 360N000083 HC SURGERY LEVEL 3 W/ FLUORO, PER MIN: Performed by: INTERNAL MEDICINE

## 2021-04-27 PROCEDURE — 255N000002 HC RX 255 OP 636: Performed by: INTERNAL MEDICINE

## 2021-04-27 PROCEDURE — 80053 COMPREHEN METABOLIC PANEL: CPT | Performed by: STUDENT IN AN ORGANIZED HEALTH CARE EDUCATION/TRAINING PROGRAM

## 2021-04-27 PROCEDURE — 81025 URINE PREGNANCY TEST: CPT | Performed by: ANESTHESIOLOGY

## 2021-04-27 PROCEDURE — 999N000141 HC STATISTIC PRE-PROCEDURE NURSING ASSESSMENT: Performed by: INTERNAL MEDICINE

## 2021-04-27 PROCEDURE — 85610 PROTHROMBIN TIME: CPT | Performed by: STUDENT IN AN ORGANIZED HEALTH CARE EDUCATION/TRAINING PROGRAM

## 2021-04-27 DEVICE — IMPLANTABLE DEVICE
Type: IMPLANTABLE DEVICE | Site: PANCREATIC DUCT | Status: NON-FUNCTIONAL
Removed: 2021-06-15

## 2021-04-27 RX ORDER — FENTANYL CITRATE 50 UG/ML
INJECTION, SOLUTION INTRAMUSCULAR; INTRAVENOUS PRN
Status: DISCONTINUED | OUTPATIENT
Start: 2021-04-27 | End: 2021-04-27

## 2021-04-27 RX ORDER — NALOXONE HYDROCHLORIDE 0.4 MG/ML
0.4 INJECTION, SOLUTION INTRAMUSCULAR; INTRAVENOUS; SUBCUTANEOUS
Status: DISCONTINUED | OUTPATIENT
Start: 2021-04-27 | End: 2021-04-27 | Stop reason: HOSPADM

## 2021-04-27 RX ORDER — FLUMAZENIL 0.1 MG/ML
0.2 INJECTION, SOLUTION INTRAVENOUS
Status: DISCONTINUED | OUTPATIENT
Start: 2021-04-27 | End: 2021-04-27 | Stop reason: HOSPADM

## 2021-04-27 RX ORDER — NALOXONE HYDROCHLORIDE 0.4 MG/ML
0.2 INJECTION, SOLUTION INTRAMUSCULAR; INTRAVENOUS; SUBCUTANEOUS
Status: DISCONTINUED | OUTPATIENT
Start: 2021-04-27 | End: 2021-04-27 | Stop reason: HOSPADM

## 2021-04-27 RX ORDER — ESMOLOL HYDROCHLORIDE 10 MG/ML
INJECTION INTRAVENOUS PRN
Status: DISCONTINUED | OUTPATIENT
Start: 2021-04-27 | End: 2021-04-27

## 2021-04-27 RX ORDER — CITRIC ACID/SODIUM CITRATE 334-500MG
30 SOLUTION, ORAL ORAL ONCE
Status: COMPLETED | OUTPATIENT
Start: 2021-04-27 | End: 2021-04-27

## 2021-04-27 RX ORDER — HYDROMORPHONE HYDROCHLORIDE 1 MG/ML
.3-.5 INJECTION, SOLUTION INTRAMUSCULAR; INTRAVENOUS; SUBCUTANEOUS EVERY 10 MIN PRN
Status: DISCONTINUED | OUTPATIENT
Start: 2021-04-27 | End: 2021-04-27 | Stop reason: HOSPADM

## 2021-04-27 RX ORDER — MEPERIDINE HYDROCHLORIDE 25 MG/ML
12.5 INJECTION INTRAMUSCULAR; INTRAVENOUS; SUBCUTANEOUS
Status: DISCONTINUED | OUTPATIENT
Start: 2021-04-27 | End: 2021-04-27 | Stop reason: HOSPADM

## 2021-04-27 RX ORDER — ONDANSETRON 4 MG/1
4 TABLET, ORALLY DISINTEGRATING ORAL EVERY 30 MIN PRN
Status: DISCONTINUED | OUTPATIENT
Start: 2021-04-27 | End: 2021-04-27 | Stop reason: HOSPADM

## 2021-04-27 RX ORDER — SODIUM CHLORIDE, SODIUM LACTATE, POTASSIUM CHLORIDE, CALCIUM CHLORIDE 600; 310; 30; 20 MG/100ML; MG/100ML; MG/100ML; MG/100ML
INJECTION, SOLUTION INTRAVENOUS CONTINUOUS PRN
Status: DISCONTINUED | OUTPATIENT
Start: 2021-04-27 | End: 2021-04-27

## 2021-04-27 RX ORDER — FENTANYL CITRATE 50 UG/ML
25-50 INJECTION, SOLUTION INTRAMUSCULAR; INTRAVENOUS EVERY 5 MIN PRN
Status: DISCONTINUED | OUTPATIENT
Start: 2021-04-27 | End: 2021-04-27 | Stop reason: HOSPADM

## 2021-04-27 RX ORDER — ONDANSETRON 2 MG/ML
4 INJECTION INTRAMUSCULAR; INTRAVENOUS EVERY 6 HOURS PRN
Status: DISCONTINUED | OUTPATIENT
Start: 2021-04-27 | End: 2021-04-27 | Stop reason: HOSPADM

## 2021-04-27 RX ORDER — LABETALOL HYDROCHLORIDE 5 MG/ML
5 INJECTION, SOLUTION INTRAVENOUS EVERY 10 MIN PRN
Status: DISCONTINUED | OUTPATIENT
Start: 2021-04-27 | End: 2021-04-27 | Stop reason: HOSPADM

## 2021-04-27 RX ORDER — INDOMETHACIN 50 MG/1
100 SUPPOSITORY RECTAL
Status: COMPLETED | OUTPATIENT
Start: 2021-04-27 | End: 2021-04-27

## 2021-04-27 RX ORDER — LIDOCAINE 40 MG/G
CREAM TOPICAL
Status: DISCONTINUED | OUTPATIENT
Start: 2021-04-27 | End: 2021-04-27 | Stop reason: HOSPADM

## 2021-04-27 RX ORDER — HYDRALAZINE HYDROCHLORIDE 20 MG/ML
2.5-5 INJECTION INTRAMUSCULAR; INTRAVENOUS
Status: DISCONTINUED | OUTPATIENT
Start: 2021-04-27 | End: 2021-04-27 | Stop reason: HOSPADM

## 2021-04-27 RX ORDER — PROPOFOL 10 MG/ML
INJECTION, EMULSION INTRAVENOUS PRN
Status: DISCONTINUED | OUTPATIENT
Start: 2021-04-27 | End: 2021-04-27

## 2021-04-27 RX ORDER — OXYCODONE AND ACETAMINOPHEN 5; 325 MG/1; MG/1
1 TABLET ORAL EVERY 6 HOURS PRN
Qty: 20 TABLET | Refills: 0 | Status: SHIPPED | OUTPATIENT
Start: 2021-04-27 | End: 2021-06-11

## 2021-04-27 RX ORDER — LEVOFLOXACIN 5 MG/ML
INJECTION, SOLUTION INTRAVENOUS PRN
Status: DISCONTINUED | OUTPATIENT
Start: 2021-04-27 | End: 2021-04-27

## 2021-04-27 RX ORDER — FENTANYL CITRATE 50 UG/ML
25-50 INJECTION, SOLUTION INTRAMUSCULAR; INTRAVENOUS
Status: DISCONTINUED | OUTPATIENT
Start: 2021-04-27 | End: 2021-04-27 | Stop reason: HOSPADM

## 2021-04-27 RX ORDER — LIDOCAINE HYDROCHLORIDE 20 MG/ML
INJECTION, SOLUTION INFILTRATION; PERINEURAL PRN
Status: DISCONTINUED | OUTPATIENT
Start: 2021-04-27 | End: 2021-04-27

## 2021-04-27 RX ORDER — IOPAMIDOL 510 MG/ML
INJECTION, SOLUTION INTRAVASCULAR PRN
Status: DISCONTINUED | OUTPATIENT
Start: 2021-04-27 | End: 2021-04-27 | Stop reason: HOSPADM

## 2021-04-27 RX ORDER — ONDANSETRON 2 MG/ML
4 INJECTION INTRAMUSCULAR; INTRAVENOUS EVERY 30 MIN PRN
Status: DISCONTINUED | OUTPATIENT
Start: 2021-04-27 | End: 2021-04-27 | Stop reason: HOSPADM

## 2021-04-27 RX ORDER — OXYCODONE HYDROCHLORIDE 5 MG/1
5 TABLET ORAL EVERY 4 HOURS PRN
Status: DISCONTINUED | OUTPATIENT
Start: 2021-04-27 | End: 2021-04-27 | Stop reason: HOSPADM

## 2021-04-27 RX ORDER — ALBUTEROL SULFATE 0.83 MG/ML
2.5 SOLUTION RESPIRATORY (INHALATION) EVERY 4 HOURS PRN
Status: DISCONTINUED | OUTPATIENT
Start: 2021-04-27 | End: 2021-04-27 | Stop reason: HOSPADM

## 2021-04-27 RX ADMIN — HYDROMORPHONE HYDROCHLORIDE 0.5 MG: 1 INJECTION, SOLUTION INTRAMUSCULAR; INTRAVENOUS; SUBCUTANEOUS at 11:11

## 2021-04-27 RX ADMIN — FAMOTIDINE 20 MG: 20 INJECTION, SOLUTION INTRAVENOUS at 10:37

## 2021-04-27 RX ADMIN — PROPOFOL 30 MG: 10 INJECTION, EMULSION INTRAVENOUS at 08:17

## 2021-04-27 RX ADMIN — ONDANSETRON 4 MG: 2 INJECTION INTRAMUSCULAR; INTRAVENOUS at 10:40

## 2021-04-27 RX ADMIN — PROPOFOL 50 MG: 10 INJECTION, EMULSION INTRAVENOUS at 08:20

## 2021-04-27 RX ADMIN — FENTANYL CITRATE 100 MCG: 50 INJECTION, SOLUTION INTRAMUSCULAR; INTRAVENOUS at 08:16

## 2021-04-27 RX ADMIN — ALBUTEROL SULFATE 2.5 MG: 2.5 SOLUTION RESPIRATORY (INHALATION) at 10:49

## 2021-04-27 RX ADMIN — ONDANSETRON 4 MG: 2 INJECTION INTRAMUSCULAR; INTRAVENOUS at 07:51

## 2021-04-27 RX ADMIN — FENTANYL CITRATE 50 MCG: 50 INJECTION, SOLUTION INTRAMUSCULAR; INTRAVENOUS at 10:58

## 2021-04-27 RX ADMIN — ROCURONIUM BROMIDE 60 MG: 10 INJECTION INTRAVENOUS at 08:18

## 2021-04-27 RX ADMIN — FENTANYL CITRATE 50 MCG: 50 INJECTION, SOLUTION INTRAMUSCULAR; INTRAVENOUS at 11:16

## 2021-04-27 RX ADMIN — LEVOFLOXACIN 500 MG: 5 INJECTION, SOLUTION INTRAVENOUS at 08:26

## 2021-04-27 RX ADMIN — FENTANYL CITRATE 50 MCG: 50 INJECTION, SOLUTION INTRAMUSCULAR; INTRAVENOUS at 10:20

## 2021-04-27 RX ADMIN — PROCHLORPERAZINE EDISYLATE 2.5 MG: 5 INJECTION INTRAMUSCULAR; INTRAVENOUS at 11:05

## 2021-04-27 RX ADMIN — GLUCAGON HYDROCHLORIDE 0.4 MG: KIT at 09:00

## 2021-04-27 RX ADMIN — SUGAMMADEX 200 MG: 100 INJECTION, SOLUTION INTRAVENOUS at 10:06

## 2021-04-27 RX ADMIN — HYDROMORPHONE HYDROCHLORIDE 0.5 MG: 1 INJECTION, SOLUTION INTRAMUSCULAR; INTRAVENOUS; SUBCUTANEOUS at 10:45

## 2021-04-27 RX ADMIN — ESMOLOL HYDROCHLORIDE 30 MG: 10 INJECTION, SOLUTION INTRAVENOUS at 09:15

## 2021-04-27 RX ADMIN — OXYCODONE HYDROCHLORIDE 5 MG: 5 TABLET ORAL at 11:34

## 2021-04-27 RX ADMIN — PROPOFOL 120 MG: 10 INJECTION, EMULSION INTRAVENOUS at 08:15

## 2021-04-27 RX ADMIN — SODIUM CITRATE AND CITRIC ACID MONOHYDRATE 30 ML: 500; 334 SOLUTION ORAL at 10:37

## 2021-04-27 RX ADMIN — FENTANYL CITRATE 50 MCG: 50 INJECTION, SOLUTION INTRAMUSCULAR; INTRAVENOUS at 10:39

## 2021-04-27 RX ADMIN — SODIUM CHLORIDE, POTASSIUM CHLORIDE, SODIUM LACTATE AND CALCIUM CHLORIDE: 600; 310; 30; 20 INJECTION, SOLUTION INTRAVENOUS at 08:11

## 2021-04-27 RX ADMIN — LIDOCAINE HYDROCHLORIDE 100 MG: 20 INJECTION, SOLUTION INFILTRATION; PERINEURAL at 08:15

## 2021-04-27 RX ADMIN — MIDAZOLAM 1 MG: 1 INJECTION INTRAMUSCULAR; INTRAVENOUS at 08:05

## 2021-04-27 ASSESSMENT — MIFFLIN-ST. JEOR: SCORE: 1252.13

## 2021-04-27 NOTE — DISCHARGE INSTRUCTIONS
[facility], [place]  Same-Day Surgery ERCP Procedure   Adult Discharge Orders & Instructions     You had a procedure known as an Endoscopic Retrograde Cholangiopancreatography (ERCP). Your healthcare provider performed the ERCP to look at your bile or pancreatic ducts, and to locate and/or treat blockages (dilation, stenting, removal, etc.) in these ducts. Often biopsies, small samples of tissue, are taken to help diagnose and/or classify stages of disease growth. This procedure is used to diagnose diseases of the pancreas, bile ducts, pancreatic duct, liver, and gallbladder.     After your procedure   1. You may return to your pre-procedure diet.   2. Restart all home medications as you normally would take them. You may take your Atenolol when you get home since you missed your morning dose.   3. You have been prescribed Percocet which contains Acetaminophen (TYLENOL); you may take one tab of Percocet every 4 to 6 hours as needed for abdominal pain/discomfort. DO NOT TAKE ANY ACETAMINOPHEN or ACETAMINOPHEN containing products while you are taking the Percocet.   4. Dr. Ashford's nurse will call you in the coming days to schedule your follow-up appointment. Dr. Ashford would like to repeat the ERCP in 2-3 months. You may call his office with any further questions.       For 24 hours after surgery  1. Get plenty of rest.  A responsible adult must stay with you for at least 24 hours after you leave the hospital.   2. Do not drive or use heavy equipment.  If you have weakness or tingling, don't drive or use heavy equipment until this feeling goes away.  3. Do not drink alcohol.  4. Avoid strenuous or risky activities (gym, yoga, cycling, etc.).  Ask for help when climbing stairs.   5. You may feel lightheaded.  IF so, sit for a few minutes before standing.  Have someone help you get up.   6. If you have nausea (feel sick to your stomach): Drink only clear liquids such as apple juice, ginger  lucinda, broth or 7-Up.  Rest may also help.  Be sure to drink enough fluids.  Move to a regular diet as you feel able.  7. If you feel bloated or have too much gas, use a heating pad on your belly to help reduce the discomfort. This should help you feel better.   8. You may have a slight fever. This is normal for the first 24 hours.   9. You may have a dry mouth, a sore throat, muscle aches or trouble sleeping.  These are normal and will go away after 24 hours. A sore throat is most common. Use lozenges or gargle with salt water to ease the discomfort.   10. Do not make important or legal decisions.      Call your doctor for any of the followin. Chest pain, and/or shortness of breath  2. Abdominal  pain, bloating or cramping that has not improved or does not respond to pain reliving medications (Tylenol or narcotics if prescribed)   3. Difficulty swallowing or feeling as though food or liquids are stuck in your throat   4. Sore throat lasting more than 2 days or pain that has worsened over time   5. Black or tarry stools   6. Nausea and/or vomiting that is not resolving or has not responded to anti-nausea medications prescribed to you   7. It has been over 8 to 10 hours since surgery and you are still not able to urinate (pass water)   8. Headache for over 24 hours   9. Fever over 100.5 F (38 C) lasting more than 24 hours after the procedure   10. Signs of jaundice or blockage (fever, chills, abdominal pain, yellowing of the whites of your eyes, yellowing of your skin, and/or passing darker than normal urine)     To contact a doctor, call:   [ ] Dr Ashford's clinic at [phone] (Monday thru Friday 8:00am to 4:30pm)   [ ] [phone] and ask for the GASTROENTEROLOGY medicine resident on call (answered 24 hours a day)   [ ] Emergency Department: [facility]: [phone]     Take it easy when you get home.  Remember, same day surgery DOES NOT MEAN SAME DAY RECOVERY!  Healing is a gradual process.  You will  need some time to recover - you may be more tired than you realize at first.  Rest and relax for at least the first 24 hours at home.  You'll feel better and heal faster if you take good care of yourself.

## 2021-04-27 NOTE — ANESTHESIA CARE TRANSFER NOTE
Patient: Keerthi Montemayor    Procedure(s):  ENDOSCOPIC RETROGRADE CHOLANGIOPANCREATOGRAPHY,WITH PANCRETIC DUCT STONE REMOVAL, AND PLACEMENT OF STENTS    Diagnosis: Chronic recurrent pancreatitis (H) [K86.1]  Diagnosis Additional Information: No value filed.    Anesthesia Type:   General     Note:    Oropharynx: oropharynx clear of all foreign objects  Level of Consciousness: awake  Oxygen Supplementation: face mask    Independent Airway: airway patency satisfactory and stable  Dentition: dentition unchanged  Vital Signs Stable: post-procedure vital signs reviewed and stable  Report to RN Given: handoff report given  Patient transferred to: PACU  Comments: VSS. Patient endorses some mild nausea, denies pain. All questions answered to RN.   Handoff Report: Identifed the Patient, Identified the Reponsible Provider, Reviewed the pertinent medical history, Discussed the surgical course, Reviewed Intra-OP anesthesia mangement and issues during anesthesia, Set expectations for post-procedure period and Allowed opportunity for questions and acknowledgement of understanding      Vitals: (Last set prior to Anesthesia Care Transfer)  CRNA VITALS  4/27/2021 0942 - 4/27/2021 1021      4/27/2021             Pulse:  91    Ht Rate:  92    SpO2:  99 %        Electronically Signed By: Farhan Brown MD  April 27, 2021  10:21 AM

## 2021-04-27 NOTE — OR NURSING
AMBROSE aware patient has met PACU/phase I discharge criteria AMBROSE gave okay for patient to progress to phase II and will complete sign-out.

## 2021-04-27 NOTE — ADDENDUM NOTE
Addendum  created 04/27/21 1225 by Linden Larson MD    Attestation recorded in Intraprocedure, Intraprocedure Attestations filed

## 2021-04-27 NOTE — OR NURSING
Dr. Bautista at bedside, patient okay to be discharge once criteria has been met[. Dr. Ashford does NOT need to see patient in phase II.

## 2021-04-27 NOTE — OR NURSING
Discharge instructions to pt and responsible adult.-  Father.           All questions regarding discharge information answered.  Pt and responsible adult verbalized understanding of all discharge instruction information given.  Printed copy of AVS sent with pt upon discharge.

## 2021-04-27 NOTE — ANESTHESIA PROCEDURE NOTES
Airway       Patient location during procedure: OR  Staff -        Anesthesiologist:  Linden Larson MD       Resident/Fellow: Farhan Brown MD       Performed By: residentIndications and Patient Condition       Indications for airway management: alan-procedural       Induction type:intravenous       Mask difficulty assessment: 1 - vent by mask    Final Airway Details       Final airway type: endotracheal airway       Successful airway: ETT - single  Endotracheal Airway Details        ETT size (mm): 7.0       Cuffed: yes       Successful intubation technique: direct laryngoscopy       DL Blade Type: MAC 3       Grade View of Cords: 1       Adjucts: stylet       Position: Right       Measured from: gums/teeth       Secured at (cm): 22       Bite block used: None    Post intubation assessment        Placement verified by: capnometry        Number of attempts at approach: 1       Number of other approaches attempted: 0       Secured with: pink tape       Ease of procedure: easy       Dentition: Intact    Medication(s) Administered   Medication Administration Time: 4/27/2021 8:19 AM

## 2021-04-27 NOTE — ANESTHESIA POSTPROCEDURE EVALUATION
Patient: Keerthi Montemayor    Procedure(s):  ENDOSCOPIC RETROGRADE CHOLANGIOPANCREATOGRAPHY,WITH PANCRETIC DUCT STONE REMOVAL, AND PLACEMENT OF STENTS    Diagnosis:Chronic recurrent pancreatitis (H) [K86.1]  Diagnosis Additional Information: No value filed.    Anesthesia Type:  General    Note:  Disposition: Outpatient   Postop Pain Control: Uneventful            Sign Out: Well controlled pain   PONV: Yes            Symptoms: Nausea only            Sign Out: PONV/POV resolved with treatment   Neuro/Psych: Uneventful            Sign Out: Acceptable/Baseline neuro status   Airway/Respiratory: Uneventful            Sign Out: Acceptable/Baseline resp. status   CV/Hemodynamics: Uneventful            Sign Out: Acceptable CV status; No obvious hypovolemia; No obvious fluid overload   Other NRE: NONE   DID A NON-ROUTINE EVENT OCCUR? No    Event details/Postop Comments:  Nausea and GERD sensation upon arrival in PACU. Improved w/ bicitra, pepcid, and compazine. Okay for discharge from PACU.            Last vitals:  Vitals:    04/27/21 1145 04/27/21 1200 04/27/21 1207   BP: 115/63 115/71 125/72   Pulse: 61 61 68   Resp: 21 18 18   Temp: 36.6  C (97.8  F)  35.6  C (96.1  F)   SpO2: 93% 94% 99%       Last vitals prior to Anesthesia Care Transfer:  CRNA VITALS  4/27/2021 0942 - 4/27/2021 1042      4/27/2021             Pulse:  91    Ht Rate:  92    SpO2:  99 %          Electronically Signed By: Linden Maldonado MD  April 27, 2021  12:23 PM

## 2021-04-27 NOTE — OR NURSING
Pt noted to have no biliary or pancreatic stents present at start of procedure.  Stents explanted in EMR.

## 2021-04-29 ENCOUNTER — MYC REFILL (OUTPATIENT)
Dept: FAMILY MEDICINE | Facility: CLINIC | Age: 41
End: 2021-04-29

## 2021-04-29 DIAGNOSIS — K86.1 CHRONIC RECURRENT PANCREATITIS (H): ICD-10-CM

## 2021-04-29 RX ORDER — OXYCODONE AND ACETAMINOPHEN 5; 325 MG/1; MG/1
1 TABLET ORAL EVERY 6 HOURS PRN
Qty: 20 TABLET | Refills: 0 | Status: CANCELLED | OUTPATIENT
Start: 2021-04-29

## 2021-05-05 ENCOUNTER — PREP FOR PROCEDURE (OUTPATIENT)
Dept: GASTROENTEROLOGY | Facility: CLINIC | Age: 41
End: 2021-05-05

## 2021-05-05 ENCOUNTER — PATIENT OUTREACH (OUTPATIENT)
Dept: GASTROENTEROLOGY | Facility: CLINIC | Age: 41
End: 2021-05-05

## 2021-05-05 DIAGNOSIS — K86.89 PANCREATIC STONES: Primary | ICD-10-CM

## 2021-05-05 NOTE — TELEPHONE ENCOUNTER
Post ERCP (21) with Dr. Ashford: Follow-up    Post procedure recommendations:   Schedule ERCP with Pedrito in OR with Dr. Ashford in  2-months to confirm dorsal pancreatic duct stone  clearance    Orders placed:   Please assist in scheduling:     Procedure/Imaging/Clinic: ERCP w/pedrito  Physician: Dr. Ashford  Timin months  Procedure length:90 min  Anesthesia:general  Dx: pancreatic stone  Tier:3  Location: [facility]       Patient states: left message    Clinic contact and scheduling numbers verified for future questions/concerns.    Lolita Taylor RN Care Coordinator

## 2021-06-01 ENCOUNTER — TELEPHONE (OUTPATIENT)
Dept: GASTROENTEROLOGY | Facility: CLINIC | Age: 41
End: 2021-06-01

## 2021-06-01 DIAGNOSIS — Z11.59 ENCOUNTER FOR SCREENING FOR OTHER VIRAL DISEASES: ICD-10-CM

## 2021-06-01 NOTE — TELEPHONE ENCOUNTER
[facility] Call Center    Phone Message    May a detailed message be left on voicemail: yes     Reason for Call: Symptoms or Concerns     If patient has red-flag symptoms, warm transfer to triage line    Current symptom or concern: Nausea, Right Upper Abdominal Pain Worsens With Activity     Symptoms have been present for:  1 week(s)    Has patient previously been seen for this? Yes    By : Dr. Ashford    Date: 06/01/21    Are there any new or worsening symptoms? Yes: Nausea, Right Upper Abdominal Pain Worsens With Activity       Action Taken: Message routed to:  [facility]    Travel Screening: Not Applicable

## 2021-06-01 NOTE — TELEPHONE ENCOUNTER
"Per last ERCP note 4/27/21  Schedule ERCP with SpJimy in OR with Dr. Ashford in 2-months to confirm dorsal pancreatic duct stone  clearance.     Pt due for repeat procedure end of June. Called to discuss with patient, says she's having \"buring pain in RUQ\", nausated, not able to eat a log, \"persistent, worse with activity\", pain reported to be 6/10. Due to intensiety of sytmpoms, scheduled procedure on next available with Dr. Ashford      Pt scheduled for repeat ERCP 6/15. Advised our office does not prescribe pain meds. Reminded patient she needs preop and covid test prior to procedure.    ML  "

## 2021-06-09 NOTE — BRIEF OP NOTE
[facility] Brief Operative Note    Pre-operative diagnosis: Pancreatic stones [K86.89]   Post-operative diagnosis Chronic pancreatitis   Procedure: Procedure(s):  ENDOSCOPIC RETROGRADE CHOLANGIOPANCREATOGRAPHY, WITH PANCREATIC DUCT STENT EXCHANGE   Surgeon: Janes Ashford MD   Assistants(s): Raymond Bautista MD   Estimated blood loss: None    Specimens: None       Findings:  - Minor papilla w/ two indwelling transpapillary Zimmon 4-Fr single pigtail stents. Both stents extracted.  - Dorsal pancreatogram w/ mildly dilated (~3-5 mm) irregular main pancreatic duct, with numerous dilated side-branches. No contrast opacification of the duct of [place], indicative of pancreas divisum vs pseudodivisum.  - No resistance or debris extracted via balloon sweeps of the dorsal duct.  - Self-ejecting transpapillary (minor papilla) Mansfield-Ashford 4-Fr x 11-cm plastic stent deployed into dorsal duct.    Recommendations:  - Administer Lactated Ringer's 1 L IV bolus in PACU.  - Prescribed Dilaudid 4 mg tablets to discharge pharmacy for post-procedural abdominal pain.  - Observe in PACU/3C and discharge.     Raymond Bautista MD on 6/15/2021 at 9:48 AM    History:  39 yo F w/ h/o recurrent acute-on-chronic pancreatitis (etiology: EtOH + tobacco] x8-yrs). S/P EUS (12/11/20; Dr. Colmenares) w/ likely pancreas divisum w/ dorsal duct stones. No prior clinical response to PERT. 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 S/P index ERCP w/ traction-type minor papillotomy + dorsal duct debris extraction (stone not extracted) + Advanix 3-Fr stents (x2) + biliary sphincterotomy + self-ejecting biliary stent (3/23/21). S/P 2nd ERCP w/ dorsal duct stone extraction + transpapillary (minor) Zimmon 4-Fr x 8-cm SPS + Zimmon 4-Fr x 6-cm SPS (4/27/21). After last ERCP, patient had 2-3 weeks of pain relief,  followed by recurrence of pain. Today (6/15/21), patient presents for 3rd ERCP for complete dorsal duct stone clearance.

## 2021-06-11 DIAGNOSIS — Z11.59 ENCOUNTER FOR SCREENING FOR OTHER VIRAL DISEASES: ICD-10-CM

## 2021-06-11 LAB
SARS-COV-2 RNA RESP QL NAA+PROBE: NORMAL
SPECIMEN SOURCE: NORMAL

## 2021-06-11 PROCEDURE — U0003 INFECTIOUS AGENT DETECTION BY NUCLEIC ACID (DNA OR RNA); SEVERE ACUTE RESPIRATORY SYNDROME CORONAVIRUS 2 (SARS-COV-2) (CORONAVIRUS DISEASE [COVID-19]), AMPLIFIED PROBE TECHNIQUE, MAKING USE OF HIGH THROUGHPUT TECHNOLOGIES AS DESCRIBED BY CMS-2020-01-R: HCPCS | Performed by: INTERNAL MEDICINE

## 2021-06-11 PROCEDURE — U0005 INFEC AGEN DETEC AMPLI PROBE: HCPCS | Performed by: INTERNAL MEDICINE

## 2021-06-11 RX ORDER — DULOXETIN HYDROCHLORIDE 60 MG/1
60 CAPSULE, DELAYED RELEASE ORAL DAILY
COMMUNITY

## 2021-06-12 LAB
LABORATORY COMMENT REPORT: NORMAL
SARS-COV-2 RNA RESP QL NAA+PROBE: NEGATIVE
SPECIMEN SOURCE: NORMAL

## 2021-06-14 ENCOUNTER — ANESTHESIA EVENT (OUTPATIENT)
Dept: SURGERY | Facility: CLINIC | Age: 41
End: 2021-06-14
Payer: COMMERCIAL

## 2021-06-14 ASSESSMENT — LIFESTYLE VARIABLES: TOBACCO_USE: 1

## 2021-06-14 NOTE — ANESTHESIA PREPROCEDURE EVALUATION
Anesthesia Pre-Procedure Evaluation    Patient: Keerthi Montemayor   MRN: [id number] : 1980        Preoperative Diagnosis: Pancreatic stones [K86.89]   Procedure : Procedure(s):  ENDOSCOPIC RETROGRADE CHOLANGIOPANCREATOGRAPHY, WITH DIRECT DUCT VISUALIZATION, USING PANCREATICOBILIARY FIBEROPTIC PROBE     Past Medical History:   Diagnosis Date     Gastroesophageal reflux disease      Pancreatic disease       Past Surgical History:   Procedure Laterality Date     ENDOSCOPIC RETROGRADE CHOLANGIOPANCREATOGRAM N/A 3/23/2021    Procedure: ENDOSCOPIC RETROGRADE CHOLANGIOPANCREATOGRAPHY, WITH PANCREATIC DUCT DILATION AND STENT PLACEMENT X 2 , BILE DUCT STENT PLACEMENT X 1, MINOR PAPILLOTOMY, BILIARY   SPHINCTEROTOMY;  Surgeon: Janes Ashford MD;  Location: [facility] OR     ENDOSCOPIC RETROGRADE CHOLANGIOPANCREATOGRAM N/A 2021    Procedure: ENDOSCOPIC RETROGRADE CHOLANGIOPANCREATOGRAPHY,WITH PANCRETIC DUCT STONE REMOVAL, AND PLACEMENT OF STENTS;  Surgeon: Janes Ashford MD;  Location: [facility] OR     HC COLONOSCOPY THRU STOMA, DIAGNOSTIC      done for IBS, NORMAL exam     NO HISTORY OF SURGERY        Allergies   Allergen Reactions     Penicillins Hives     Amoxicillin Hives      Social History     Tobacco Use     Smoking status: Current Every Day Smoker     Smokeless tobacco: Never Used   Substance Use Topics     Alcohol use: Not Currently      Wt Readings from Last 1 Encounters:   21 61.3 kg (135 lb 2.3 oz)        Anesthesia Evaluation   Pt has had prior anesthetic. Type: General and MAC.    No history of anesthetic complications       ROS/MED HX  ENT/Pulmonary:     (+) tobacco use, Current use, 1  Pack-Year Hx,      Neurologic:  - neg neurologic ROS     Cardiovascular:     (+) hypertension-----    METS/Exercise Tolerance: >4 METS    Hematologic:  - neg hematologic  ROS     Musculoskeletal:  - neg musculoskeletal ROS     GI/Hepatic: Comment: Chronic pancreatitis with pancreatic duct stones    Celiac  disease    (+) GERD, Asymptomatic on medication,     Renal/Genitourinary:       Endo:  - neg endo ROS     Psychiatric/Substance Use:     (+) psychiatric history anxiety alcohol abuse Recreational drug usage: Cannabis.    Infectious Disease:  - neg infectious disease ROS     Malignancy:  - neg malignancy ROS     Other:               OUTSIDE LABS:  CBC:   Lab Results   Component Value Date    WBC 5.4 04/27/2021    WBC 9.2 03/23/2021    HGB 10.5 (L) 04/27/2021    HGB 10.5 (L) 03/23/2021    HCT 33.6 (L) 04/27/2021    HCT 31.7 (L) 03/23/2021     04/27/2021     03/23/2021     BMP:   Lab Results   Component Value Date     04/27/2021     (L) 03/23/2021    POTASSIUM 4.0 04/27/2021    POTASSIUM 3.8 03/23/2021    CHLORIDE 105 04/27/2021    CHLORIDE 100 03/23/2021    CO2 25 04/27/2021    CO2 25 03/23/2021    BUN 9 04/27/2021    BUN 6 (L) 03/23/2021    CR 0.70 04/27/2021    CR 0.62 03/23/2021     (H) 04/27/2021    GLC 86 03/23/2021     COAGS:   Lab Results   Component Value Date    INR 1.14 04/27/2021     POC:   Lab Results   Component Value Date     (H) 04/27/2021    HCG Negative 04/27/2021     HEPATIC:   Lab Results   Component Value Date    ALBUMIN 3.3 (L) 04/27/2021    PROTTOTAL 6.7 (L) 04/27/2021     (H) 04/27/2021     (H) 04/27/2021    ALKPHOS 110 04/27/2021    BILITOTAL 0.4 04/27/2021     OTHER:   Lab Results   Component Value Date    PH 6.5 06/01/2006    HERMAN 8.6 04/27/2021    LIPASE 40 (L) 04/27/2021    AMYLASE 88 04/27/2021       Anesthesia Plan    ASA Status:  3   NPO Status:  NPO Appropriate    Anesthesia Type: General.     - Airway: ETT   Induction: Intravenous.   Maintenance: Inhalation.        Consents    Anesthesia Plan(s) and associated risks, benefits, and realistic alternatives discussed. Questions answered and patient/representative(s) expressed understanding.     - Discussed with:  Patient      - Extended Intubation/Ventilatory Support Discussed: Yes.       - Patient is DNR/DNI Status: No    Use of blood products discussed: Yes.     - Discussed with: Patient.     Postoperative Care    Pain management: IV analgesics.   PONV prophylaxis: Ondansetron (or other 5HT-3), Dexamethasone or Solumedrol     Comments:                Christopher J. Behrens, MD

## 2021-06-15 ENCOUNTER — APPOINTMENT (OUTPATIENT)
Dept: GENERAL RADIOLOGY | Facility: CLINIC | Age: 41
End: 2021-06-15
Attending: INTERNAL MEDICINE
Payer: COMMERCIAL

## 2021-06-15 ENCOUNTER — HOSPITAL ENCOUNTER (OUTPATIENT)
Facility: CLINIC | Age: 41
Discharge: HOME | End: 2021-06-15
Attending: INTERNAL MEDICINE | Admitting: INTERNAL MEDICINE
Payer: COMMERCIAL

## 2021-06-15 ENCOUNTER — ANESTHESIA (OUTPATIENT)
Dept: SURGERY | Facility: CLINIC | Age: 41
End: 2021-06-15
Payer: COMMERCIAL

## 2021-06-15 VITALS
RESPIRATION RATE: 16 BRPM | WEIGHT: 134.92 LBS | SYSTOLIC BLOOD PRESSURE: 139 MMHG | DIASTOLIC BLOOD PRESSURE: 84 MMHG | OXYGEN SATURATION: 100 % | HEART RATE: 56 BPM | BODY MASS INDEX: 23.91 KG/M2 | TEMPERATURE: 97.8 F | HEIGHT: 63 IN

## 2021-06-15 DIAGNOSIS — K86.1 CHRONIC RECURRENT PANCREATITIS (H): Primary | ICD-10-CM

## 2021-06-15 DIAGNOSIS — K86.89 PANCREATIC STONES: ICD-10-CM

## 2021-06-15 LAB
ALBUMIN SERPL-MCNC: 3.6 G/DL (ref 3.4–5)
ALP SERPL-CCNC: 74 U/L (ref 40–150)
ALT SERPL W P-5'-P-CCNC: 23 U/L (ref 0–50)
AMYLASE SERPL-CCNC: 58 U/L (ref 30–110)
ANION GAP SERPL CALCULATED.3IONS-SCNC: 4 MMOL/L (ref 3–14)
AST SERPL W P-5'-P-CCNC: 25 U/L (ref 0–45)
BILIRUB SERPL-MCNC: 0.7 MG/DL (ref 0.2–1.3)
BUN SERPL-MCNC: 6 MG/DL (ref 7–30)
CALCIUM SERPL-MCNC: 8.4 MG/DL (ref 8.5–10.1)
CHLORIDE SERPL-SCNC: 105 MMOL/L (ref 94–109)
CO2 SERPL-SCNC: 26 MMOL/L (ref 20–32)
CREAT SERPL-MCNC: 0.68 MG/DL (ref 0.52–1.04)
ERYTHROCYTE [DISTWIDTH] IN BLOOD BY AUTOMATED COUNT: 15.2 % (ref 10–15)
GFR SERPL CREATININE-BSD FRML MDRD: >90 ML/MIN/{1.73_M2}
GLUCOSE BLDC GLUCOMTR-MCNC: 97 MG/DL (ref 70–99)
GLUCOSE SERPL-MCNC: 104 MG/DL (ref 70–99)
HCG UR QL: NEGATIVE
HCT VFR BLD AUTO: 34.6 % (ref 35–47)
HGB BLD-MCNC: 11 G/DL (ref 11.7–15.7)
INR PPP: 1.14 (ref 0.86–1.14)
LIPASE SERPL-CCNC: 15 U/L (ref 73–393)
MCH RBC QN AUTO: 25.3 PG (ref 26.5–33)
MCHC RBC AUTO-ENTMCNC: 31.8 G/DL (ref 31.5–36.5)
MCV RBC AUTO: 80 FL (ref 78–100)
PLATELET # BLD AUTO: 204 10E9/L (ref 150–450)
POTASSIUM SERPL-SCNC: 3.5 MMOL/L (ref 3.4–5.3)
PROT SERPL-MCNC: 7.1 G/DL (ref 6.8–8.8)
RBC # BLD AUTO: 4.34 10E12/L (ref 3.8–5.2)
SODIUM SERPL-SCNC: 135 MMOL/L (ref 133–144)
WBC # BLD AUTO: 6.7 10E9/L (ref 4–11)

## 2021-06-15 PROCEDURE — 710N000010 HC RECOVERY PHASE 1, LEVEL 2, PER MIN: Performed by: INTERNAL MEDICINE

## 2021-06-15 PROCEDURE — 80053 COMPREHEN METABOLIC PANEL: CPT | Performed by: STUDENT IN AN ORGANIZED HEALTH CARE EDUCATION/TRAINING PROGRAM

## 2021-06-15 PROCEDURE — 258N000003 HC RX IP 258 OP 636: Performed by: ANESTHESIOLOGY

## 2021-06-15 PROCEDURE — C1726 CATH, BAL DIL, NON-VASCULAR: HCPCS | Performed by: INTERNAL MEDICINE

## 2021-06-15 PROCEDURE — 370N000017 HC ANESTHESIA TECHNICAL FEE, PER MIN: Performed by: INTERNAL MEDICINE

## 2021-06-15 PROCEDURE — 255N000002 HC RX 255 OP 636: Performed by: INTERNAL MEDICINE

## 2021-06-15 PROCEDURE — 83690 ASSAY OF LIPASE: CPT | Performed by: STUDENT IN AN ORGANIZED HEALTH CARE EDUCATION/TRAINING PROGRAM

## 2021-06-15 PROCEDURE — 250N000011 HC RX IP 250 OP 636: Performed by: ANESTHESIOLOGY

## 2021-06-15 PROCEDURE — 250N000011 HC RX IP 250 OP 636

## 2021-06-15 PROCEDURE — 999N000141 HC STATISTIC PRE-PROCEDURE NURSING ASSESSMENT: Performed by: INTERNAL MEDICINE

## 2021-06-15 PROCEDURE — 250N000009 HC RX 250

## 2021-06-15 PROCEDURE — 82150 ASSAY OF AMYLASE: CPT | Performed by: STUDENT IN AN ORGANIZED HEALTH CARE EDUCATION/TRAINING PROGRAM

## 2021-06-15 PROCEDURE — 360N000082 HC SURGERY LEVEL 2 W/ FLUORO, PER MIN: Performed by: INTERNAL MEDICINE

## 2021-06-15 PROCEDURE — 81025 URINE PREGNANCY TEST: CPT | Performed by: ANESTHESIOLOGY

## 2021-06-15 PROCEDURE — C1877 STENT, NON-COAT/COV W/O DEL: HCPCS | Performed by: INTERNAL MEDICINE

## 2021-06-15 PROCEDURE — 710N000012 HC RECOVERY PHASE 2, PER MINUTE: Performed by: INTERNAL MEDICINE

## 2021-06-15 PROCEDURE — 250N000013 HC RX MED GY IP 250 OP 250 PS 637: Performed by: ANESTHESIOLOGY

## 2021-06-15 PROCEDURE — 36415 COLL VENOUS BLD VENIPUNCTURE: CPT | Performed by: STUDENT IN AN ORGANIZED HEALTH CARE EDUCATION/TRAINING PROGRAM

## 2021-06-15 PROCEDURE — C1769 GUIDE WIRE: HCPCS | Performed by: INTERNAL MEDICINE

## 2021-06-15 PROCEDURE — 999N000179 XR SURGERY CARM FLUORO LESS THAN 5 MIN W STILLS: Mod: TC

## 2021-06-15 PROCEDURE — 85027 COMPLETE CBC AUTOMATED: CPT | Performed by: STUDENT IN AN ORGANIZED HEALTH CARE EDUCATION/TRAINING PROGRAM

## 2021-06-15 PROCEDURE — 85610 PROTHROMBIN TIME: CPT | Performed by: STUDENT IN AN ORGANIZED HEALTH CARE EDUCATION/TRAINING PROGRAM

## 2021-06-15 PROCEDURE — 250N000009 HC RX 250: Performed by: INTERNAL MEDICINE

## 2021-06-15 PROCEDURE — 82962 GLUCOSE BLOOD TEST: CPT

## 2021-06-15 PROCEDURE — 272N000001 HC OR GENERAL SUPPLY STERILE: Performed by: INTERNAL MEDICINE

## 2021-06-15 PROCEDURE — 250N000025 HC SEVOFLURANE, PER MIN: Performed by: INTERNAL MEDICINE

## 2021-06-15 DEVICE — STENT FREEMAN PANCREA FLEX 4FRX11CM W/O FLANGE SGL PIGTAIL: Type: IMPLANTABLE DEVICE | Site: PANCREATIC DUCT | Status: FUNCTIONAL

## 2021-06-15 RX ORDER — SODIUM CHLORIDE, SODIUM LACTATE, POTASSIUM CHLORIDE, CALCIUM CHLORIDE 600; 310; 30; 20 MG/100ML; MG/100ML; MG/100ML; MG/100ML
INJECTION, SOLUTION INTRAVENOUS CONTINUOUS
Status: DISCONTINUED | OUTPATIENT
Start: 2021-06-15 | End: 2021-06-15 | Stop reason: HOSPADM

## 2021-06-15 RX ORDER — HYDRALAZINE HYDROCHLORIDE 20 MG/ML
2.5-5 INJECTION INTRAMUSCULAR; INTRAVENOUS EVERY 10 MIN PRN
Status: DISCONTINUED | OUTPATIENT
Start: 2021-06-15 | End: 2021-06-15 | Stop reason: HOSPADM

## 2021-06-15 RX ORDER — INDOMETHACIN 50 MG/1
100 SUPPOSITORY RECTAL
Status: DISCONTINUED | OUTPATIENT
Start: 2021-06-15 | End: 2021-06-15 | Stop reason: HOSPADM

## 2021-06-15 RX ORDER — OXYCODONE HYDROCHLORIDE 5 MG/1
5 TABLET ORAL EVERY 4 HOURS PRN
Status: DISCONTINUED | OUTPATIENT
Start: 2021-06-15 | End: 2021-06-15 | Stop reason: HOSPADM

## 2021-06-15 RX ORDER — FENTANYL CITRATE 50 UG/ML
INJECTION, SOLUTION INTRAMUSCULAR; INTRAVENOUS PRN
Status: DISCONTINUED | OUTPATIENT
Start: 2021-06-15 | End: 2021-06-15

## 2021-06-15 RX ORDER — NALOXONE HYDROCHLORIDE 0.4 MG/ML
0.4 INJECTION, SOLUTION INTRAMUSCULAR; INTRAVENOUS; SUBCUTANEOUS
Status: DISCONTINUED | OUTPATIENT
Start: 2021-06-15 | End: 2021-06-15 | Stop reason: HOSPADM

## 2021-06-15 RX ORDER — LABETALOL HYDROCHLORIDE 5 MG/ML
10 INJECTION, SOLUTION INTRAVENOUS
Status: DISCONTINUED | OUTPATIENT
Start: 2021-06-15 | End: 2021-06-15 | Stop reason: HOSPADM

## 2021-06-15 RX ORDER — ALBUTEROL SULFATE 0.83 MG/ML
2.5 SOLUTION RESPIRATORY (INHALATION) EVERY 4 HOURS PRN
Status: DISCONTINUED | OUTPATIENT
Start: 2021-06-15 | End: 2021-06-15 | Stop reason: HOSPADM

## 2021-06-15 RX ORDER — KETAMINE HYDROCHLORIDE 10 MG/ML
INJECTION INTRAMUSCULAR; INTRAVENOUS PRN
Status: DISCONTINUED | OUTPATIENT
Start: 2021-06-15 | End: 2021-06-15

## 2021-06-15 RX ORDER — FLUMAZENIL 0.1 MG/ML
0.2 INJECTION, SOLUTION INTRAVENOUS
Status: CANCELLED | OUTPATIENT
Start: 2021-06-15 | End: 2021-06-15

## 2021-06-15 RX ORDER — LIDOCAINE HYDROCHLORIDE 20 MG/ML
INJECTION, SOLUTION INFILTRATION; PERINEURAL PRN
Status: DISCONTINUED | OUTPATIENT
Start: 2021-06-15 | End: 2021-06-15

## 2021-06-15 RX ORDER — ONDANSETRON 4 MG/1
4 TABLET, ORALLY DISINTEGRATING ORAL EVERY 30 MIN PRN
Status: COMPLETED | OUTPATIENT
Start: 2021-06-15 | End: 2021-06-15

## 2021-06-15 RX ORDER — DEXAMETHASONE SODIUM PHOSPHATE 4 MG/ML
INJECTION, SOLUTION INTRA-ARTICULAR; INTRALESIONAL; INTRAMUSCULAR; INTRAVENOUS; SOFT TISSUE PRN
Status: DISCONTINUED | OUTPATIENT
Start: 2021-06-15 | End: 2021-06-15

## 2021-06-15 RX ORDER — LIDOCAINE 40 MG/G
CREAM TOPICAL
Status: DISCONTINUED | OUTPATIENT
Start: 2021-06-15 | End: 2021-06-15 | Stop reason: HOSPADM

## 2021-06-15 RX ORDER — NALOXONE HYDROCHLORIDE 0.4 MG/ML
0.2 INJECTION, SOLUTION INTRAMUSCULAR; INTRAVENOUS; SUBCUTANEOUS
Status: DISCONTINUED | OUTPATIENT
Start: 2021-06-15 | End: 2021-06-15 | Stop reason: HOSPADM

## 2021-06-15 RX ORDER — LEVOFLOXACIN 5 MG/ML
INJECTION, SOLUTION INTRAVENOUS PRN
Status: DISCONTINUED | OUTPATIENT
Start: 2021-06-15 | End: 2021-06-15

## 2021-06-15 RX ORDER — ONDANSETRON 2 MG/ML
INJECTION INTRAMUSCULAR; INTRAVENOUS PRN
Status: DISCONTINUED | OUTPATIENT
Start: 2021-06-15 | End: 2021-06-15

## 2021-06-15 RX ORDER — PROPOFOL 10 MG/ML
INJECTION, EMULSION INTRAVENOUS PRN
Status: DISCONTINUED | OUTPATIENT
Start: 2021-06-15 | End: 2021-06-15

## 2021-06-15 RX ORDER — ACETAMINOPHEN 325 MG/1
975 TABLET ORAL ONCE
Status: COMPLETED | OUTPATIENT
Start: 2021-06-15 | End: 2021-06-15

## 2021-06-15 RX ORDER — IOPAMIDOL 510 MG/ML
INJECTION, SOLUTION INTRAVASCULAR PRN
Status: DISCONTINUED | OUTPATIENT
Start: 2021-06-15 | End: 2021-06-15 | Stop reason: HOSPADM

## 2021-06-15 RX ORDER — INDOMETHACIN 50 MG/1
SUPPOSITORY RECTAL PRN
Status: DISCONTINUED | OUTPATIENT
Start: 2021-06-15 | End: 2021-06-15 | Stop reason: HOSPADM

## 2021-06-15 RX ORDER — ONDANSETRON 8 MG/1
8 TABLET, ORALLY DISINTEGRATING ORAL EVERY 8 HOURS PRN
Qty: 60 TABLET | Refills: 1 | Status: SHIPPED | OUTPATIENT
Start: 2021-06-15

## 2021-06-15 RX ORDER — FENTANYL CITRATE 50 UG/ML
25-50 INJECTION, SOLUTION INTRAMUSCULAR; INTRAVENOUS
Status: DISCONTINUED | OUTPATIENT
Start: 2021-06-15 | End: 2021-06-15 | Stop reason: HOSPADM

## 2021-06-15 RX ORDER — HYDROMORPHONE HYDROCHLORIDE 1 MG/ML
.3-.5 INJECTION, SOLUTION INTRAMUSCULAR; INTRAVENOUS; SUBCUTANEOUS EVERY 10 MIN PRN
Status: DISCONTINUED | OUTPATIENT
Start: 2021-06-15 | End: 2021-06-15 | Stop reason: HOSPADM

## 2021-06-15 RX ORDER — HYDROMORPHONE HYDROCHLORIDE 4 MG/1
4 TABLET ORAL EVERY 6 HOURS PRN
Qty: 25 TABLET | Refills: 0 | Status: SHIPPED | OUTPATIENT
Start: 2021-06-15 | End: 2021-09-03

## 2021-06-15 RX ORDER — ONDANSETRON 2 MG/ML
4 INJECTION INTRAMUSCULAR; INTRAVENOUS EVERY 30 MIN PRN
Status: COMPLETED | OUTPATIENT
Start: 2021-06-15 | End: 2021-06-15

## 2021-06-15 RX ORDER — FENTANYL CITRATE 50 UG/ML
25-50 INJECTION, SOLUTION INTRAMUSCULAR; INTRAVENOUS EVERY 5 MIN PRN
Status: DISCONTINUED | OUTPATIENT
Start: 2021-06-15 | End: 2021-06-15 | Stop reason: HOSPADM

## 2021-06-15 RX ADMIN — Medication 30 MG: at 08:29

## 2021-06-15 RX ADMIN — LIDOCAINE HYDROCHLORIDE 30 MG: 20 INJECTION, SOLUTION INFILTRATION; PERINEURAL at 08:29

## 2021-06-15 RX ADMIN — FENTANYL CITRATE 100 MCG: 50 INJECTION, SOLUTION INTRAMUSCULAR; INTRAVENOUS at 08:29

## 2021-06-15 RX ADMIN — DEXAMETHASONE SODIUM PHOSPHATE 4 MG: 4 INJECTION, SOLUTION INTRA-ARTICULAR; INTRALESIONAL; INTRAMUSCULAR; INTRAVENOUS; SOFT TISSUE at 08:29

## 2021-06-15 RX ADMIN — FENTANYL CITRATE 50 MCG: 50 INJECTION, SOLUTION INTRAMUSCULAR; INTRAVENOUS at 10:32

## 2021-06-15 RX ADMIN — SUGAMMADEX 200 MG: 100 INJECTION, SOLUTION INTRAVENOUS at 09:14

## 2021-06-15 RX ADMIN — SODIUM CHLORIDE, POTASSIUM CHLORIDE, SODIUM LACTATE AND CALCIUM CHLORIDE: 600; 310; 30; 20 INJECTION, SOLUTION INTRAVENOUS at 08:18

## 2021-06-15 RX ADMIN — OXYCODONE HYDROCHLORIDE 5 MG: 5 TABLET ORAL at 11:56

## 2021-06-15 RX ADMIN — HYDROMORPHONE HYDROCHLORIDE 0.5 MG: 1 INJECTION, SOLUTION INTRAMUSCULAR; INTRAVENOUS; SUBCUTANEOUS at 10:45

## 2021-06-15 RX ADMIN — LEVOFLOXACIN 500 MG: 5 INJECTION, SOLUTION INTRAVENOUS at 08:41

## 2021-06-15 RX ADMIN — PROPOFOL 100 MG: 10 INJECTION, EMULSION INTRAVENOUS at 08:29

## 2021-06-15 RX ADMIN — ONDANSETRON 4 MG: 2 INJECTION INTRAMUSCULAR; INTRAVENOUS at 11:53

## 2021-06-15 RX ADMIN — ONDANSETRON 4 MG: 2 INJECTION INTRAMUSCULAR; INTRAVENOUS at 08:29

## 2021-06-15 RX ADMIN — FENTANYL CITRATE 50 MCG: 50 INJECTION, SOLUTION INTRAMUSCULAR; INTRAVENOUS at 09:58

## 2021-06-15 RX ADMIN — MIDAZOLAM 2 MG: 1 INJECTION INTRAMUSCULAR; INTRAVENOUS at 08:16

## 2021-06-15 RX ADMIN — ACETAMINOPHEN 975 MG: 325 TABLET, FILM COATED ORAL at 09:58

## 2021-06-15 RX ADMIN — FENTANYL CITRATE 50 MCG: 50 INJECTION, SOLUTION INTRAMUSCULAR; INTRAVENOUS at 09:10

## 2021-06-15 RX ADMIN — ROCURONIUM BROMIDE 50 MG: 10 INJECTION INTRAVENOUS at 08:29

## 2021-06-15 RX ADMIN — ONDANSETRON 4 MG: 2 INJECTION INTRAMUSCULAR; INTRAVENOUS at 09:43

## 2021-06-15 RX ADMIN — FENTANYL CITRATE 50 MCG: 50 INJECTION, SOLUTION INTRAMUSCULAR; INTRAVENOUS at 10:22

## 2021-06-15 RX ADMIN — FENTANYL CITRATE 50 MCG: 50 INJECTION, SOLUTION INTRAMUSCULAR; INTRAVENOUS at 10:08

## 2021-06-15 ASSESSMENT — MIFFLIN-ST. JEOR: SCORE: 1251.13

## 2021-06-15 NOTE — ANESTHESIA CARE TRANSFER NOTE
Patient: Keerthi Montemayor    Procedure(s):  ENDOSCOPIC RETROGRADE CHOLANGIOPANCREATOGRAPHY, WITH PANCREATIC DUCT STENT EXCHANGE    Diagnosis: Pancreatic stones [K86.89]  Diagnosis Additional Information: No value filed.    Anesthesia Type:   General     Note:    Oropharynx: spontaneously breathing and oropharynx clear of all foreign objects  Level of Consciousness: awake  Oxygen Supplementation: room air    Independent Airway: airway patency satisfactory and stable  Dentition: dentition unchanged      Patient transferred to: PACU  Comments: Pt extubated in the OR without incident or complications. Pt VSS upon arrival to the PACU. Pt has no c/o pain/N/V. Pt care report given to receiving RN>   Handoff Report: Identifed the Patient, Identified the Reponsible Provider, Reviewed the pertinent medical history, Discussed the surgical course, Reviewed Intra-OP anesthesia mangement and issues during anesthesia, Set expectations for post-procedure period and Allowed opportunity for questions and acknowledgement of understanding      Vitals: (Last set prior to Anesthesia Care Transfer)  CRNA VITALS  6/15/2021 0858 - 6/15/2021 0933      6/15/2021             Resp Rate (observed):  (!) 6        Electronically Signed By: ROSARIO Farooq CRNA  Susie 15, 2021  9:33 AM

## 2021-06-15 NOTE — ANESTHESIA POSTPROCEDURE EVALUATION
Patient: Keerthi Montemayor    Procedure(s):  ENDOSCOPIC RETROGRADE CHOLANGIOPANCREATOGRAPHY, WITH PANCREATIC DUCT STENT EXCHANGE    Diagnosis:Pancreatic stones [K86.89]  Diagnosis Additional Information: No value filed.    Anesthesia Type:  General    Note:  Disposition: Outpatient   Postop Pain Control: Uneventful            Sign Out: Well controlled pain   PONV: No   Neuro/Psych: Uneventful            Sign Out: Acceptable/Baseline neuro status   Airway/Respiratory: Uneventful            Sign Out: Acceptable/Baseline resp. status   CV/Hemodynamics: Uneventful            Sign Out: Acceptable CV status   Other NRE: NONE   DID A NON-ROUTINE EVENT OCCUR? No           Last vitals:  Vitals:    06/15/21 1015 06/15/21 1030 06/15/21 1045   BP: 127/70 136/59 122/70   Pulse: 65 65 57   Resp: 18 16 18   Temp: 36.8  C (98.2  F) 36.7  C (98.1  F) 36.6  C (97.9  F)   SpO2: 97% 98% 96%       Last vitals prior to Anesthesia Care Transfer:  CRNA VITALS  6/15/2021 0858 - 6/15/2021 0958      6/15/2021             Resp Rate (observed):  (!) 6          Electronically Signed By: Christopher J. Behrens, MD  Susie 15, 2021  11:03 AM

## 2021-06-15 NOTE — OR NURSING
Paged  [phone]: Pt Keerthi Montemayor having nausea in post op, no order for zofran P.O.  Please advise.  *[phone]    MD ordered IV Zofran for post op and tablets to take home d/t post op nausea.

## 2021-06-15 NOTE — DISCHARGE INSTRUCTIONS
[facility]  Same-Day Surgery   Adult Discharge Orders & Instructions     For 24 hours after surgery    1. Get plenty of rest.  A responsible adult must stay with you for at least 24 hours after you leave the hospital.   2. Do not drive or use heavy equipment.  If you have weakness or tingling, don't drive or use heavy equipment until this feeling goes away.  3. Do not drink alcohol.  4. Avoid strenuous or risky activities.  Ask for help when climbing stairs.   5. You may feel lightheaded.  IF so, sit for a few minutes before standing.  Have someone help you get up.   6. If you have nausea (feel sick to your stomach): Drink only clear liquids such as apple juice, ginger ale, broth or 7-Up.  Rest may also help.  Be sure to drink enough fluids.  Move to a regular diet as you feel able.  7. You may have a slight fever. Call the doctor if your fever is over 100 F (37.7 C) (taken under the tongue) or lasts longer than 24 hours.  8. You may have a dry mouth, a sore throat, muscle aches or trouble sleeping.  These should go away after 24 hours.  9. Do not make important or legal decisions.   Call your doctor for any of the followin.  Signs of infection (fever, growing tenderness at the surgery site, a large amount of drainage or bleeding, severe pain, foul-smelling drainage, redness, swelling).    2. It has been over 8 to 10 hours since surgery and you are still not able to urinate (pass water).    3.  Headache for over 24 hours.    To contact a doctor, call Dr Ashford's clinic at [phone] office hours        [phone] and ask for the resident on call for gastroenterology (answered 24 hours a day)      Emergency Department:    [facility]: [phone]       (TTY for hearing impaired: [phone])    [facility]: [phone]       (TTY for hearing impaired: [phone])

## 2021-06-15 NOTE — ANESTHESIA PROCEDURE NOTES
Airway       Patient location during procedure: OR  Staff -        Anesthesiologist:  Behrens, Christopher J, MD       CRNA: Paresh Bonner APRN CRNA       Other Anesthesia Staff: Karuna Ramirez RN       Performed By: SRNAIndications and Patient Condition       Indications for airway management: alan-procedural         Mask difficulty assessment: 1 - vent by mask    Final Airway Details       Final airway type: endotracheal airway       Successful airway: ETT - single and Oral  Endotracheal Airway Details        ETT size (mm): 7.0       Cuffed: yes       Successful intubation technique: direct laryngoscopy       DL Blade Type: Tomas 2       Grade View of Cords: 1       Adjucts: stylet       Position: Right       Measured from: gums/teeth       Secured at (cm): 22    Post intubation assessment        Placement verified by: capnometry, equal breath sounds and chest rise        Number of attempts at approach: 1       Secured with: pink tape       Ease of procedure: easy       Dentition: Intact and Unchanged    Medication(s) Administered   Medication Administration Time: 6/15/2021 8:31 AM

## 2021-06-16 LAB — ERCP: NORMAL

## 2021-06-21 ENCOUNTER — PATIENT OUTREACH (OUTPATIENT)
Dept: GASTROENTEROLOGY | Facility: CLINIC | Age: 41
End: 2021-06-21

## 2021-06-21 DIAGNOSIS — K86.1 CHRONIC RECURRENT PANCREATITIS (H): Primary | ICD-10-CM

## 2021-06-21 DIAGNOSIS — Z46.89 ENCOUNTER FOR REMOVAL OF PANCREATIC STENT: ICD-10-CM

## 2021-06-21 NOTE — TELEPHONE ENCOUNTER
Post ERCP (6-15-21) with Dr. Ashford: Follow-up    Post procedure recommendations:   Refer patient to Pain Management Clinic for management of chronic pancreatitis type pain (patient wishes to    establish care with [facility] pain management).     - Refer patient to Nutrition for nutritional counseling  (per patient request).     - Schedule virtual clinic with Dr. Ashford in 3-4 months    for interval monitoring. If patient continues to have   intractable abdominal pain, she will need consideration  of TPIAT, given that her dorsal duct is now cleared.      - Order KUB in 4-weeks to confirm spontaneous passage of pancreatic stent.     Orders placed: KUB 4 weeks    Patient states: feeling fine, no concerns, understands follow up    Clinic contact and scheduling numbers verified for future questions/concerns.    Lolita Taylor, RN Care Coordinator

## 2021-07-05 ENCOUNTER — DOCUMENTATION ONLY (OUTPATIENT)
Dept: GASTROENTEROLOGY | Facility: CLINIC | Age: 41
End: 2021-07-05

## 2021-07-05 NOTE — PROGRESS NOTES
Forms from [employer] for Pt's procedure done on 6/15/21 completed and faxed back.   Fax: [phone].    Copy of forms sent to scanning.    Bandar Javier LPN

## 2021-07-11 ENCOUNTER — TELEPHONE (OUTPATIENT)
Dept: GASTROENTEROLOGY | Facility: CLINIC | Age: 41
End: 2021-07-11

## 2021-07-19 ENCOUNTER — TELEPHONE (OUTPATIENT)
Dept: GASTROENTEROLOGY | Facility: CLINIC | Age: 41
End: 2021-07-19

## 2021-07-19 NOTE — TELEPHONE ENCOUNTER
"Called pt to schedule;    \"Can you please call pt to schedule f/ up with Ling Roldan (anytime) and virtual clinic with Dejon in 3-4 months?      - Refer patient to Nutrition for nutritional counseling  (per patient request).     - Schedule virtual clinic with Dr. Ashford in 3-4 months\"    Left L pod number and sent Myc.    "

## 2021-07-20 ENCOUNTER — DOCUMENTATION ONLY (OUTPATIENT)
Dept: GASTROENTEROLOGY | Facility: CLINIC | Age: 41
End: 2021-07-20

## 2021-07-20 ENCOUNTER — TELEPHONE (OUTPATIENT)
Dept: GASTROENTEROLOGY | Facility: CLINIC | Age: 41
End: 2021-07-20

## 2021-07-20 NOTE — TELEPHONE ENCOUNTER
Called Pt to follow up on xray order following on stent placement, DOS: 6/15/21.    Per Pt , she is currently admitted at [facility] and xray was done on 7/17/21.     Will call [facility] to have images pushed.    Bandar Javier LPN

## 2021-07-20 NOTE — PROGRESS NOTES
Called to have images pushed.    [facility]    [address] [place] [address]    [phone]    Per [facility], no xray done. Only scan done was the CT.     Asked for CT images to be pushed.    Bandar Javier LPN

## 2021-07-26 ENCOUNTER — TELEPHONE (OUTPATIENT)
Dept: GASTROENTEROLOGY | Facility: CLINIC | Age: 41
End: 2021-07-26

## 2021-07-26 NOTE — TELEPHONE ENCOUNTER
"Per Dr. Ashford,  \"Our stents are gone, BUT......   She has another pancreatic stone obstructing the dorsal pancreatic duct.   If [facility] did not get a hold of her and do ERCP, we should get her back here for another ERCP w [facility] to clear her dorsal duct.\"    Called Pt to discuss the above. No answer. Left VM for pt to call back.     Bandar Javier, IZABELLA        "

## 2021-07-27 ENCOUNTER — DOCUMENTATION ONLY (OUTPATIENT)
Dept: GASTROENTEROLOGY | Facility: CLINIC | Age: 41
End: 2021-07-27

## 2021-07-27 ENCOUNTER — TELEPHONE (OUTPATIENT)
Dept: GASTROENTEROLOGY | Facility: CLINIC | Age: 41
End: 2021-07-27

## 2021-07-27 NOTE — TELEPHONE ENCOUNTER
Called Pt again to discuss recent results from CT scan done at [facility].    No answer. Left VM for Pt to call back.    Bandar Javier LPN

## 2021-08-02 ENCOUNTER — OFFICE VISIT (OUTPATIENT)
Dept: ANESTHESIOLOGY | Facility: CLINIC | Age: 41
End: 2021-08-02
Attending: INTERNAL MEDICINE
Payer: COMMERCIAL

## 2021-08-02 ENCOUNTER — TELEPHONE (OUTPATIENT)
Dept: GASTROENTEROLOGY | Facility: CLINIC | Age: 41
End: 2021-08-02

## 2021-08-02 VITALS
DIASTOLIC BLOOD PRESSURE: 71 MMHG | WEIGHT: 134 LBS | SYSTOLIC BLOOD PRESSURE: 103 MMHG | HEART RATE: 85 BPM | BODY MASS INDEX: 23.74 KG/M2 | HEIGHT: 63 IN | OXYGEN SATURATION: 98 %

## 2021-08-02 DIAGNOSIS — K86.1 CHRONIC RECURRENT PANCREATITIS (H): ICD-10-CM

## 2021-08-02 PROCEDURE — 99203 OFFICE O/P NEW LOW 30 MIN: CPT | Performed by: ANESTHESIOLOGY

## 2021-08-02 RX ORDER — GABAPENTIN 300 MG/1
900 CAPSULE ORAL 3 TIMES DAILY
Qty: 270 CAPSULE | Refills: 0 | Status: SHIPPED | OUTPATIENT
Start: 2021-08-02

## 2021-08-02 ASSESSMENT — ENCOUNTER SYMPTOMS
WHEEZING: 0
JOINT SWELLING: 0
HYPERTENSION: 1
COUGH: 1
HEMATURIA: 1
INSOMNIA: 1
LEG PAIN: 1
SYNCOPE: 0
POSTURAL DYSPNEA: 0
COUGH DISTURBING SLEEP: 1
DIARRHEA: 1
HYPOTENSION: 0
PALPITATIONS: 1
BLOOD IN STOOL: 0
SMELL DISTURBANCE: 0
NERVOUS/ANXIOUS: 1
BLOATING: 1
ABDOMINAL PAIN: 1
VOMITING: 1
DECREASED CONCENTRATION: 0
NAUSEA: 1
SORE THROAT: 0
DYSURIA: 0
HOARSE VOICE: 1
CONSTIPATION: 1
SLEEP DISTURBANCES DUE TO BREATHING: 0
ARTHRALGIAS: 1
LIGHT-HEADEDNESS: 1
SNORES LOUDLY: 0
FLANK PAIN: 0
NECK PAIN: 1
MUSCLE CRAMPS: 1
RECTAL PAIN: 1
EXERCISE INTOLERANCE: 0
ORTHOPNEA: 0
HEARTBURN: 1
JAUNDICE: 0
DIFFICULTY URINATING: 0
DEPRESSION: 1
MUSCLE WEAKNESS: 1
BACK PAIN: 1
SINUS PAIN: 1
STIFFNESS: 0
PANIC: 1
SINUS CONGESTION: 0
DYSPNEA ON EXERTION: 1
HEMOPTYSIS: 0
NECK MASS: 0
TROUBLE SWALLOWING: 1
BOWEL INCONTINENCE: 0
HOT FLASHES: 1
MYALGIAS: 1
SHORTNESS OF BREATH: 1
DECREASED LIBIDO: 0
TASTE DISTURBANCE: 0
SPUTUM PRODUCTION: 1

## 2021-08-02 ASSESSMENT — PATIENT HEALTH QUESTIONNAIRE - PHQ9
10. IF YOU CHECKED OFF ANY PROBLEMS, HOW DIFFICULT HAVE THESE PROBLEMS MADE IT FOR YOU TO DO YOUR WORK, TAKE CARE OF THINGS AT HOME, OR GET ALONG WITH OTHER PEOPLE: EXTREMELY DIFFICULT
SUM OF ALL RESPONSES TO PHQ QUESTIONS 1-9: 16
SUM OF ALL RESPONSES TO PHQ QUESTIONS 1-9: 16

## 2021-08-02 ASSESSMENT — PAIN SCALES - GENERAL: PAINLEVEL: MODERATE PAIN (4)

## 2021-08-02 ASSESSMENT — MIFFLIN-ST. JEOR: SCORE: 1246.95

## 2021-08-02 NOTE — PROGRESS NOTES
Pain Clinic New Patient Consult Note:    Referring Provider: Dejon   Primary care provider: Erica Alexisjohnnie Montemayor is a 40 year old y.o. old female who presents to the pain clinic with right upper quadrant abdominal pain    HPI:  Patient Supplied Answers To the [facility] Pain Questionnaire  [facility] Pain -  Patient Entered Questionnaire/Answers 8/2/2021   What number best describes your pain right now:  0 = No pain  to  10 = Worst pain imaginable 4   How would you describe the pain? sharp, cutting, throbbing   Which of the following worsen your pain? walking, exercise   Which of the following improve or reduce your pain?  lying down   What number best describes your average pain for the past week:  0 = No pain  to  10 = Worst pain imaginable 4   What number best describes your LOWEST pain in past 24 hours:  0 = No pain  to  10 = Worst pain imaginable 4   What number best describes your WORST pain in past 24 hours:  0 = No pain  to  10 = Worst pain imaginable 7   When is your pain worst? Constant   What non-medicine treatments have you already had for your pain? pain clinic, physical therapy, TENS (electrical stimulator), surgery, exercise   Have you tried treating your pain with medication?  Yes   Are you currently taking medications for your pain? Yes       Is a very pleasant 40-year-old female presenting to the pain clinic by herself for right upper quadrant abdominal pain.  The patient states that recently she was in the emergency room and was set up for an appointment with me through her ER physician.  I explained to the patient that she is already established with my colleague Dr. Hawthorne and all her future return appointments will be with Dr. Hawthorne.  In the meantime I am happy to help her reestablish her care here and help her with medication refills.  The patient states that she was on gabapentin taking 900 mg 3 times a day and she ran out off it and needs a refill right away.  I have checked her   which reflects that the last gabapentin refill was in the month of May for a much lower dose and she has been taking it appropriately at 900 mg 3 times a day.    The patient reports severe right upper quadrant pain that is constant with radiation to the intrascapular area through her belly and not around her chest.  The patient states that activity, exercise, work worsen her pain and sitting improves her pain.  Since her appointment with my colleague Dr. Hawthorne she tried a TENS unit which she did not find helpful.  She has not made appointments with physical therapy or acupuncture.  She had another pain physician consultation in [place] does not remember the details and specifics of the treatments.    The patient has had ERCPs with her advanced GI group and plans to follow-up with them for possible further interventions.  She finds it difficult to eat and states that eating aggravates her pain.  More recently she had a reduction in weight to 105 pounds and gained it back in the last 2 months and is about 140 pounds currently.    Significant Medical History:   Past Medical History:   Diagnosis Date     Gastroesophageal reflux disease      Pancreatic disease           Past Surgical History:  Past Surgical History:   Procedure Laterality Date     ENDOSCOPIC RETROGRADE CHOLANGIOPANCREATOGRAM N/A 3/23/2021    Procedure: ENDOSCOPIC RETROGRADE CHOLANGIOPANCREATOGRAPHY, WITH PANCREATIC DUCT DILATION AND STENT PLACEMENT X 2 , BILE DUCT STENT PLACEMENT X 1, MINOR PAPILLOTOMY, BILIARY   SPHINCTEROTOMY;  Surgeon: Janes Ashford MD;  Location: [facility] OR     ENDOSCOPIC RETROGRADE CHOLANGIOPANCREATOGRAM N/A 4/27/2021    Procedure: ENDOSCOPIC RETROGRADE CHOLANGIOPANCREATOGRAPHY,WITH PANCRETIC DUCT STONE REMOVAL, AND PLACEMENT OF STENTS;  Surgeon: Janes Ashford MD;  Location: [facility] OR     ENDOSCOPIC RETROGRADE CHOLANGIOPANCREATOGRAPHY, EXCHANGE TUBE/STENT N/A 6/15/2021    Procedure: ENDOSCOPIC RETROGRADE  CHOLANGIOPANCREATOGRAPHY, WITH PANCREATIC DUCT STENT EXCHANGE;  Surgeon: Janes Ashford MD;  Location: [facility] OR     NO HISTORY OF SURGERY       ZZHC COLONOSCOPY THRU STOMA, DIAGNOSTIC      done for IBS, NORMAL exam          Family History:  Family History   Problem Relation Age of Onset     Diabetes Maternal Aunt      C.A.D. Paternal Grandfather          age 73     Cancer Maternal Grandfather         lung cancer          Social History:  Social History     Socioeconomic History     Marital status: Single     Spouse name: Not on file     Number of children: 0     Years of education: Not on file     Highest education level: Not on file   Occupational History     Occupation: [employer]     Employer: [employer]   Tobacco Use     Smoking status: Current Every Day Smoker     Types: Other     Smokeless tobacco: Never Used   Substance and Sexual Activity     Alcohol use: Not Currently     Drug use: Yes     Types: Marijuana     Comment: smokes     Sexual activity: Yes     Partners: Female   Other Topics Concern      Service Not Asked     Blood Transfusions Not Asked     Caffeine Concern No     Occupational Exposure Not Asked     Hobby Hazards Not Asked     Sleep Concern Not Asked     Stress Concern Not Asked     Weight Concern Not Asked     Special Diet Not Asked     Back Care Not Asked     Exercise No     Bike Helmet Not Asked     Seat Belt Yes     Self-Exams Not Asked     Parent/sibling w/ CABG, MI or angioplasty before 65F 55M? Not Asked   Social History Narrative     Not on file     Social Determinants of Health     Financial Resource Strain:      Difficulty of Paying Living Expenses:    Food Insecurity:      Worried About Running Out of Food in the Last Year:      Ran Out of Food in the Last Year:    Transportation Needs:      Lack of Transportation (Medical):      Lack of Transportation (Non-Medical):    Physical Activity:      Days of Exercise per Week:      Minutes of Exercise per Session:    Stress:       Feeling of Stress :    Social Connections:      Frequency of Communication with Friends and Family:      Frequency of Social Gatherings with Friends and Family:      Attends Buddhist Services:      Active Member of Clubs or Organizations:      Attends Club or Organization Meetings:      Marital Status:    Intimate Partner Violence:      Fear of Current or Ex-Partner:      Emotionally Abused:      Physically Abused:      Sexually Abused:      Social History     Social History Narrative     Not on file          Allergies:  Allergies   Allergen Reactions     Penicillins Hives     Amoxicillin Hives       Current Medications:   Current Outpatient Medications   Medication Sig Dispense Refill     alum & mag hydroxide-simethicone (MAALOX) 200-200-20 MG/5ML SUSP suspension Take 30 mLs by mouth       atenolol (TENORMIN) 25 MG tablet Take 25 mg by mouth daily        DULoxetine (CYMBALTA) 60 MG capsule Take 60 mg by mouth daily       esomeprazole (NEXIUM) 20 MG DR capsule Take 20 mg by mouth       gabapentin (NEURONTIN) 100 MG capsule Take 100 mg by mouth 3 times daily Takes as needed for heartburn/GI/abdominal pain per pt.       HYDROmorphone (DILAUDID) 4 MG tablet Take 1 tablet (4 mg) by mouth every 6 hours as needed for severe pain 25 tablet 0     lidocaine (XYLOCAINE) 2 % solution Take 15 mLs by mouth       ondansetron (ZOFRAN-ODT) 8 MG ODT tab Take 1 tablet (8 mg) by mouth every 8 hours as needed for nausea 60 tablet 1     ondansetron (ZOFRAN-ODT) 8 MG ODT tab DISSOLVE 1 TABLET IN MOUTH TWICE DAILY       triamcinolone (ARISTOCORT HP) 0.5 % external cream APPLY 1 APPLICATION TOPICALLY TWO TIMES A DAY. 2 WEEKS PER EPISODE            Current Pain Medications:  Medications related to Pain Management (From now, onward)    None           Work History:    Current work status: The patient works in the [employer] industry and finds it very difficult to complete her work.  She return to work last week.    Review of Systems:  Review  "of Systems   All other systems reviewed and are negative.    Physical Exam:     Vitals:    08/02/21 1157   BP: 103/71   Pulse: 85   SpO2: 98%   Weight: 60.8 kg (134 lb)   Height: 1.6 m (5' 3\")       General Appearance: No distress, seated comfortably  Mood: Euthymic  HE ENT: Non constricted pupils  GI: Soft, non distended, TTP with slight pressure over the right abdomen, Carnetts negative  Skin: No rashes over exposed skin  MS: moves all extremities freely against gravity, trigger points in the thoracic paraspinal areas  Neuro: grossly intact  Gait: non antalgic, ambulates with out assistance    Laboratory results:  Recent Labs   Lab Test 06/15/21  0657 04/27/21  0730    135   POTASSIUM 3.5 4.0   CHLORIDE 105 105   CO2 26 25   ANIONGAP 4 5   * 116*   BUN 6* 9   CR 0.68 0.70   HERMAN 8.4* 8.6       CBC RESULTS:   Recent Labs   Lab Test 06/15/21  0657   WBC 6.7   RBC 4.34   HGB 11.0*   HCT 34.6*   MCV 80   MCH 25.3*   MCHC 31.8   RDW 15.2*            Imaging:       ASSESSMENT AND PLAN:     Encounter Diagnosis:    1. Chronic abdominal pain  2. Myofascial pain  3. Chronic pancreatitis    Keerthi Montemayor is a 40 year old y.o. old female who presents to the pain clinic with chronic abdominal pain    RECOMMENDATIONS:     1. Medications: We are prescribing the patient renewal of gabapentin 900 mg p.o. 3 times daily. Dosing, side effects, risks/benefits/alternatives were discussed with the patient in detail.    2.  I have placed referrals for physical therapy and acupuncture    3.  Interventions may be considered in the future with Dr. Hawthorne    Follow up: The patient will schedule a follow-up with Dr. Hawthorne                                                                                              "

## 2021-08-02 NOTE — PATIENT INSTRUCTIONS
Medications:    Gabapentin renewal.    gabapentin (NEURONTIN) 300 MG capsule.  Take 3 capsules (900 mg) by mouth 3 times daily Takes as needed for heartburn/GI/abdominal pain per pt.      *Please provide the clinic with a minium of 1 week notice, on all prescription refills.       Referrals:    Physical Therapy Referral placed-   If you have not heard from the scheduling office within 2 business days, please call [phone] for all locations       Acupuncture Referral.  -Please call your insurance provider to find out about acupuncture coverage, and approved locations for this service.         Recommended Follow up:      Follow up with Dr. Hawthorne.          Please call [phone], option #1 to schedule your clinic appointment if you don't already have an appointment scheduled.        To speak with a nurse, schedule/reschedule/cancel a clinic appointment, or request a medication refill call: [phone], option #1.    You can also reach us by [employer]: [email or web address]

## 2021-08-02 NOTE — LETTER
August 2, 2021      Keerthi Montemayor  [address] [facility]    [place] [facility] [address]    To Whom It May Concern:    The Patient, Keerthi Montemayor, was seen today in the [facility] clinic on 8/2/21 at 1210 pm for a consult. If you have additional questions please contact the clinic.           Sincerely,  [facility] Care Team.   [facility]   [phone]

## 2021-08-02 NOTE — PROGRESS NOTES
Pain md in [place],   Gabapentin 900 mg po tid. 1 month ago  Flexeril prn - helps with back pain in between shoulder blades  Feels right through the abdomen, sawing, wincing sharp pain  Constant rapid pain.     PT- no completed.     Accupuncture not completed.     Weight was reduced to 105, now gained back to 140 over 2 months.  Was doing really well with the procedures.   Planning to follow up with Dr. Ashford for repeat ERCP.     Physical activity, eatting, caffeine, exercise increases pain  Reduced with sitting.

## 2021-08-02 NOTE — TELEPHONE ENCOUNTER
[facility] Call Center    Phone Message    May a detailed message be left on voicemail: yes     Reason for Call: Other: Pt called in asking for a mamber of the care team to reach out to her because she missed a call from the nurse who will be out of the off all week. Thank you.     Action Taken: Message routed to:  [facility] ([facility]): monica holley and nabori    Travel Screening: Not Applicable

## 2021-08-02 NOTE — LETTER
8/2/2021       RE: Keerthi Montemayor  [id number] [facility]  [address]  [facility] [facility] [facility] [address]     Dear Colleague,    Thank you for referring your patient, Keerthi Montemayor, to the [facility] FOR COMPREHENSIVE PAIN MANAGEMENT [place] at [facility]. Please see a copy of my visit note below.      Pain Clinic New Patient Consult Note:    Referring Provider: Dejon   Primary care provider: Erica Alexis    Keerthi Montemayor is a 40 year old y.o. old female who presents to the pain clinic with right upper quadrant abdominal pain    HPI:  Patient Supplied Answers To the [facility] Pain Questionnaire  [facility] Pain -  Patient Entered Questionnaire/Answers 8/2/2021   What number best describes your pain right now:  0 = No pain  to  10 = Worst pain imaginable 4   How would you describe the pain? sharp, cutting, throbbing   Which of the following worsen your pain? walking, exercise   Which of the following improve or reduce your pain?  lying down   What number best describes your average pain for the past week:  0 = No pain  to  10 = Worst pain imaginable 4   What number best describes your LOWEST pain in past 24 hours:  0 = No pain  to  10 = Worst pain imaginable 4   What number best describes your WORST pain in past 24 hours:  0 = No pain  to  10 = Worst pain imaginable 7   When is your pain worst? Constant   What non-medicine treatments have you already had for your pain? pain clinic, physical therapy, TENS (electrical stimulator), surgery, exercise   Have you tried treating your pain with medication?  Yes   Are you currently taking medications for your pain? Yes       Is a very pleasant 40-year-old female presenting to the pain clinic by herself for right upper quadrant abdominal pain.  The patient states that recently she was in the emergency room and was set up for an appointment with me through her ER physician.  I explained to the patient that she is already established with my  colleague Dr. Hawthorne and all her future return appointments will be with Dr. Hawthorne.  In the meantime I am happy to help her reestablish her care here and help her with medication refills.  The patient states that she was on gabapentin taking 900 mg 3 times a day and she ran out off it and needs a refill right away.  I have checked her  which reflects that the last gabapentin refill was in the month of May for a much lower dose and she has been taking it appropriately at 900 mg 3 times a day.    The patient reports severe right upper quadrant pain that is constant with radiation to the intrascapular area through her belly and not around her chest.  The patient states that activity, exercise, work worsen her pain and sitting improves her pain.  Since her appointment with my colleague Dr. Hawthorne she tried a TENS unit which she did not find helpful.  She has not made appointments with physical therapy or acupuncture.  She had another pain physician consultation in [place] does not remember the details and specifics of the treatments.    The patient has had ERCPs with her advanced GI group and plans to follow-up with them for possible further interventions.  She finds it difficult to eat and states that eating aggravates her pain.  More recently she had a reduction in weight to 105 pounds and gained it back in the last 2 months and is about 140 pounds currently.    Significant Medical History:   Past Medical History:   Diagnosis Date     Gastroesophageal reflux disease      Pancreatic disease           Past Surgical History:  Past Surgical History:   Procedure Laterality Date     ENDOSCOPIC RETROGRADE CHOLANGIOPANCREATOGRAM N/A 3/23/2021    Procedure: ENDOSCOPIC RETROGRADE CHOLANGIOPANCREATOGRAPHY, WITH PANCREATIC DUCT DILATION AND STENT PLACEMENT X 2 , BILE DUCT STENT PLACEMENT X 1, MINOR PAPILLOTOMY, BILIARY   SPHINCTEROTOMY;  Surgeon: Janes Ashford MD;  Location: [facility]     ENDOSCOPIC RETROGRADE  CHOLANGIOPANCREATOGRAM N/A 2021    Procedure: ENDOSCOPIC RETROGRADE CHOLANGIOPANCREATOGRAPHY,WITH PANCRETIC DUCT STONE REMOVAL, AND PLACEMENT OF STENTS;  Surgeon: Janes Ashford MD;  Location: [facility]     ENDOSCOPIC RETROGRADE CHOLANGIOPANCREATOGRAPHY, EXCHANGE TUBE/STENT N/A 6/15/2021    Procedure: ENDOSCOPIC RETROGRADE CHOLANGIOPANCREATOGRAPHY, WITH PANCREATIC DUCT STENT EXCHANGE;  Surgeon: Janes Ashford MD;  Location: [facility]     NO HISTORY OF SURGERY       ZZHC COLONOSCOPY THRU STOMA, DIAGNOSTIC      done for IBS, NORMAL exam          Family History:  Family History   Problem Relation Age of Onset     Diabetes Maternal Aunt      C.A.D. Paternal Grandfather          age 73     Cancer Maternal Grandfather         lung cancer          Social History:  Social History     Socioeconomic History     Marital status: Single     Spouse name: Not on file     Number of children: 0     Years of education: Not on file     Highest education level: Not on file   Occupational History     Occupation: [employer]     Employer: [employer]   Tobacco Use     Smoking status: Current Every Day Smoker     Types: Other     Smokeless tobacco: Never Used   Substance and Sexual Activity     Alcohol use: Not Currently     Drug use: Yes     Types: Marijuana     Comment: smokes     Sexual activity: Yes     Partners: Female   Other Topics Concern      Service Not Asked     Blood Transfusions Not Asked     Caffeine Concern No     Occupational Exposure Not Asked     Hobby Hazards Not Asked     Sleep Concern Not Asked     Stress Concern Not Asked     Weight Concern Not Asked     Special Diet Not Asked     Back Care Not Asked     Exercise No     Bike Helmet Not Asked     Seat Belt Yes     Self-Exams Not Asked     Parent/sibling w/ CABG, MI or angioplasty before 65F 55M? Not Asked   Social History Narrative     Not on file     Social Determinants of Health     Financial Resource Strain:      Difficulty of Paying Living  Expenses:    Food Insecurity:      Worried About Running Out of Food in the Last Year:      Ran Out of Food in the Last Year:    Transportation Needs:      Lack of Transportation (Medical):      Lack of Transportation (Non-Medical):    Physical Activity:      Days of Exercise per Week:      Minutes of Exercise per Session:    Stress:      Feeling of Stress :    Social Connections:      Frequency of Communication with Friends and Family:      Frequency of Social Gatherings with Friends and Family:      Attends Zoroastrianism Services:      Active Member of Clubs or Organizations:      Attends Club or Organization Meetings:      Marital Status:    Intimate Partner Violence:      Fear of Current or Ex-Partner:      Emotionally Abused:      Physically Abused:      Sexually Abused:      Social History     Social History Narrative     Not on file          Allergies:  Allergies   Allergen Reactions     Penicillins Hives     Amoxicillin Hives       Current Medications:   Current Outpatient Medications   Medication Sig Dispense Refill     alum & mag hydroxide-simethicone (MAALOX) 200-200-20 MG/5ML SUSP suspension Take 30 mLs by mouth       atenolol (TENORMIN) 25 MG tablet Take 25 mg by mouth daily        DULoxetine (CYMBALTA) 60 MG capsule Take 60 mg by mouth daily       esomeprazole (NEXIUM) 20 MG DR capsule Take 20 mg by mouth       gabapentin (NEURONTIN) 100 MG capsule Take 100 mg by mouth 3 times daily Takes as needed for heartburn/GI/abdominal pain per pt.       HYDROmorphone (DILAUDID) 4 MG tablet Take 1 tablet (4 mg) by mouth every 6 hours as needed for severe pain 25 tablet 0     lidocaine (XYLOCAINE) 2 % solution Take 15 mLs by mouth       ondansetron (ZOFRAN-ODT) 8 MG ODT tab Take 1 tablet (8 mg) by mouth every 8 hours as needed for nausea 60 tablet 1     ondansetron (ZOFRAN-ODT) 8 MG ODT tab DISSOLVE 1 TABLET IN MOUTH TWICE DAILY       triamcinolone (ARISTOCORT HP) 0.5 % external cream APPLY 1 APPLICATION TOPICALLY TWO  "TIMES A DAY. 2 WEEKS PER EPISODE            Current Pain Medications:  Medications related to Pain Management (From now, onward)    None           Work History:    Current work status: The patient works in the [employer] industry and finds it very difficult to complete her work.  She return to work last week.    Review of Systems:  Review of Systems   All other systems reviewed and are negative.    Physical Exam:     Vitals:    08/02/21 1157   BP: 103/71   Pulse: 85   SpO2: 98%   Weight: 60.8 kg (134 lb)   Height: 1.6 m (5' 3\")       General Appearance: No distress, seated comfortably  Mood: Euthymic  HE ENT: Non constricted pupils  GI: Soft, non distended, TTP with slight pressure over the right abdomen, Carnetts negative  Skin: No rashes over exposed skin  MS: moves all extremities freely against gravity, trigger points in the thoracic paraspinal areas  Neuro: grossly intact  Gait: non antalgic, ambulates with out assistance    Laboratory results:  Recent Labs   Lab Test 06/15/21  0657 04/27/21  0730    135   POTASSIUM 3.5 4.0   CHLORIDE 105 105   CO2 26 25   ANIONGAP 4 5   * 116*   BUN 6* 9   CR 0.68 0.70   HERMAN 8.4* 8.6       CBC RESULTS:   Recent Labs   Lab Test 06/15/21  0657   WBC 6.7   RBC 4.34   HGB 11.0*   HCT 34.6*   MCV 80   MCH 25.3*   MCHC 31.8   RDW 15.2*            Imaging:       ASSESSMENT AND PLAN:     Encounter Diagnosis:    1. Chronic abdominal pain  2. Myofascial pain  3. Chronic pancreatitis    Keerthi Montemayor is a 40 year old y.o. old female who presents to the pain clinic with chronic abdominal pain    RECOMMENDATIONS:     1. Medications: We are prescribing the patient renewal of gabapentin 900 mg p.o. 3 times daily. Dosing, side effects, risks/benefits/alternatives were discussed with the patient in detail.    2.  I have placed referrals for physical therapy and acupuncture    3.  Interventions may be considered in the future with Dr. Hawthorne    Follow up: The patient will " schedule a follow-up with Dr. Marine Rehmna md in [place],   Gabapentin 900 mg po tid. 1 month ago  Flexeril prn - helps with back pain in between shoulder blades  Feels right through the abdomen, sawing, wincing sharp pain  Constant rapid pain.     PT- no completed.     Accupuncture not completed.     Weight was reduced to 105, now gained back to 140 over 2 months.  Was doing really well with the procedures.   Planning to follow up with Dr. Ashford for repeat ERCP.     Physical activity, eatting, caffeine, exercise increases pain  Reduced with sitting.           Again, thank you for allowing me to participate in the care of your patient.      Sincerely,    Michelle Cole MD

## 2021-08-03 ENCOUNTER — PREP FOR PROCEDURE (OUTPATIENT)
Dept: GASTROENTEROLOGY | Facility: CLINIC | Age: 41
End: 2021-08-03

## 2021-08-03 ENCOUNTER — PATIENT OUTREACH (OUTPATIENT)
Dept: GASTROENTEROLOGY | Facility: CLINIC | Age: 41
End: 2021-08-03

## 2021-08-03 DIAGNOSIS — K86.89 PANCREATIC STONES: Primary | ICD-10-CM

## 2021-08-03 ASSESSMENT — PATIENT HEALTH QUESTIONNAIRE - PHQ9: SUM OF ALL RESPONSES TO PHQ QUESTIONS 1-9: 16

## 2021-08-03 NOTE — TELEPHONE ENCOUNTER
"Per Dr. Ashford  Our stents are gone, BUT......   She has another pancreatic stone obstructing the dorsal pancreatic duct.   If [facility] did not get a hold of her and do ERCP, we should get her back here for another ERCP w SPYglass to clear her dorsal duct.\"    Pt ok to schedule procedure, ordered as below:    Please assist in scheduling:     Procedure/Imaging/Clinic: ERCP w/ spyglass  Physician: Dr. Ashford  Timin/7  Procedure length:90 min  Anesthesia:gen  Dx: pancreatic stone  Tier:3  [facility]: [facility]     Discussed need for preop and covid test.   "

## 2021-08-04 DIAGNOSIS — Z11.59 ENCOUNTER FOR SCREENING FOR OTHER VIRAL DISEASES: ICD-10-CM

## 2021-09-03 ENCOUNTER — LAB (OUTPATIENT)
Dept: LAB | Facility: CLINIC | Age: 41
End: 2021-09-03
Attending: INTERNAL MEDICINE
Payer: OTHER GOVERNMENT

## 2021-09-03 DIAGNOSIS — Z11.59 ENCOUNTER FOR SCREENING FOR OTHER VIRAL DISEASES: ICD-10-CM

## 2021-09-03 PROCEDURE — U0003 INFECTIOUS AGENT DETECTION BY NUCLEIC ACID (DNA OR RNA); SEVERE ACUTE RESPIRATORY SYNDROME CORONAVIRUS 2 (SARS-COV-2) (CORONAVIRUS DISEASE [COVID-19]), AMPLIFIED PROBE TECHNIQUE, MAKING USE OF HIGH THROUGHPUT TECHNOLOGIES AS DESCRIBED BY CMS-2020-01-R: HCPCS

## 2021-09-03 PROCEDURE — U0005 INFEC AGEN DETEC AMPLI PROBE: HCPCS

## 2021-09-04 LAB — SARS-COV-2 RNA RESP QL NAA+PROBE: NEGATIVE

## 2021-09-06 ENCOUNTER — ANESTHESIA EVENT (OUTPATIENT)
Dept: SURGERY | Facility: CLINIC | Age: 41
End: 2021-09-06

## 2021-09-06 RX ORDER — SODIUM CHLORIDE, SODIUM LACTATE, POTASSIUM CHLORIDE, CALCIUM CHLORIDE 600; 310; 30; 20 MG/100ML; MG/100ML; MG/100ML; MG/100ML
INJECTION, SOLUTION INTRAVENOUS CONTINUOUS
Status: CANCELLED | OUTPATIENT
Start: 2021-09-06

## 2021-09-06 RX ORDER — HYDROMORPHONE HYDROCHLORIDE 1 MG/ML
0.2 INJECTION, SOLUTION INTRAMUSCULAR; INTRAVENOUS; SUBCUTANEOUS EVERY 5 MIN PRN
Status: CANCELLED | OUTPATIENT
Start: 2021-09-06

## 2021-09-06 RX ORDER — FENTANYL CITRATE 50 UG/ML
25 INJECTION, SOLUTION INTRAMUSCULAR; INTRAVENOUS EVERY 5 MIN PRN
Status: CANCELLED | OUTPATIENT
Start: 2021-09-06

## 2021-09-06 RX ORDER — ONDANSETRON 2 MG/ML
4 INJECTION INTRAMUSCULAR; INTRAVENOUS EVERY 30 MIN PRN
Status: CANCELLED | OUTPATIENT
Start: 2021-09-06

## 2021-09-06 RX ORDER — OXYCODONE HYDROCHLORIDE 5 MG/1
5 TABLET ORAL EVERY 4 HOURS PRN
Status: CANCELLED | OUTPATIENT
Start: 2021-09-06

## 2021-09-06 RX ORDER — ONDANSETRON 4 MG/1
4 TABLET, ORALLY DISINTEGRATING ORAL EVERY 30 MIN PRN
Status: CANCELLED | OUTPATIENT
Start: 2021-09-06

## 2021-09-06 ASSESSMENT — LIFESTYLE VARIABLES: TOBACCO_USE: 1

## 2021-09-06 ASSESSMENT — COPD QUESTIONNAIRES: COPD: 0

## 2021-09-06 NOTE — ANESTHESIA PREPROCEDURE EVALUATION
Anesthesia Pre-Procedure Evaluation    Patient: Keerthi Montemayor   MRN: [id number] : 1980        Preoperative Diagnosis: Pancreatic stones [K86.89]   Procedure : Procedure(s):  ENDOSCOPIC RETROGRADE CHOLANGIOPANCREATOGRAPHY, WITH DIRECT DUCT VISUALIZATION, USING PANCREATICOBILIARY FIBEROPTIC PROBE     Past Medical History:   Diagnosis Date     Gastroesophageal reflux disease      Pancreatic disease       Past Surgical History:   Procedure Laterality Date     ENDOSCOPIC RETROGRADE CHOLANGIOPANCREATOGRAM N/A 3/23/2021    Procedure: ENDOSCOPIC RETROGRADE CHOLANGIOPANCREATOGRAPHY, WITH PANCREATIC DUCT DILATION AND STENT PLACEMENT X 2 , BILE DUCT STENT PLACEMENT X 1, MINOR PAPILLOTOMY, BILIARY   SPHINCTEROTOMY;  Surgeon: Janes Ashford MD;  [facility]     ENDOSCOPIC RETROGRADE CHOLANGIOPANCREATOGRAM N/A 2021    Procedure: ENDOSCOPIC RETROGRADE CHOLANGIOPANCREATOGRAPHY,WITH PANCRETIC DUCT STONE REMOVAL, AND PLACEMENT OF STENTS;  Surgeon: Janes Ashford MD;  [facility]     ENDOSCOPIC RETROGRADE CHOLANGIOPANCREATOGRAPHY, EXCHANGE TUBE/STENT N/A 6/15/2021    Procedure: ENDOSCOPIC RETROGRADE CHOLANGIOPANCREATOGRAPHY, WITH PANCREATIC DUCT STENT EXCHANGE;  Surgeon: Janes Ashford MD;  Location: [facility]     NO HISTORY OF SURGERY       ZZHC COLONOSCOPY THRU STOMA, DIAGNOSTIC      done for IBS, NORMAL exam      Allergies   Allergen Reactions     Penicillins Hives     Amoxicillin Hives      Social History     Tobacco Use     Smoking status: Current Every Day Smoker     Types: Other     Smokeless tobacco: Never Used   Substance Use Topics     Alcohol use: Not Currently      Wt Readings from Last 1 Encounters:   21 60.8 kg (134 lb)        Anesthesia Evaluation   Pt has had prior anesthetic. Type: General.    No history of anesthetic complications       ROS/MED HX  ENT/Pulmonary:     (+) tobacco use, Current use,  (-) asthma, COPD and sleep apnea   Neurologic:       Cardiovascular:      (+) hypertension-----    METS/Exercise Tolerance:     Hematologic:       Musculoskeletal:       GI/Hepatic: Comment: Chronic pancreatitis with pancreatic duct stones  Celiac disease    (+) GERD,     Renal/Genitourinary:       Endo:       Psychiatric/Substance Use:     (+) psychiatric history anxiety alcohol abuse Recreational drug usage: Cannabis.    Infectious Disease:       Malignancy:       Other:            Physical Exam    Airway  airway exam normal      Mallampati: II   TM distance: > 3 FB   Neck ROM: full   Mouth opening: > 3 cm    Respiratory Devices and Support         Dental  no notable dental history         Cardiovascular   cardiovascular exam normal          Pulmonary   pulmonary exam normal                OUTSIDE LABS:  CBC:   Lab Results   Component Value Date    WBC 6.7 06/15/2021    WBC 5.4 04/27/2021    HGB 11.0 (L) 06/15/2021    HGB 10.5 (L) 04/27/2021    HCT 34.6 (L) 06/15/2021    HCT 33.6 (L) 04/27/2021     06/15/2021     04/27/2021     BMP:   Lab Results   Component Value Date     06/15/2021     04/27/2021    POTASSIUM 3.5 06/15/2021    POTASSIUM 4.0 04/27/2021    CHLORIDE 105 06/15/2021    CHLORIDE 105 04/27/2021    CO2 26 06/15/2021    CO2 25 04/27/2021    BUN 6 (L) 06/15/2021    BUN 9 04/27/2021    CR 0.68 06/15/2021    CR 0.70 04/27/2021     (H) 06/15/2021     (H) 04/27/2021     COAGS:   Lab Results   Component Value Date    INR 1.14 06/15/2021     POC:   Lab Results   Component Value Date    BGM 97 06/15/2021    HCG Negative 06/15/2021     HEPATIC:   Lab Results   Component Value Date    ALBUMIN 3.6 06/15/2021    PROTTOTAL 7.1 06/15/2021    ALT 23 06/15/2021    AST 25 06/15/2021    ALKPHOS 74 06/15/2021    BILITOTAL 0.7 06/15/2021     OTHER:   Lab Results   Component Value Date    PH 6.5 06/01/2006    HERMAN 8.4 (L) 06/15/2021    LIPASE 15 (L) 06/15/2021    AMYLASE 58 06/15/2021       Anesthesia Plan    ASA Status:  3   NPO Status:  NPO Appropriate     Anesthesia Type: General.     - Airway: ETT   Induction: Intravenous, RSI.   Maintenance: Inhalation.   Techniques and Equipment:     - Lines/Monitors: BIS     Consents    Anesthesia Plan(s) and associated risks, benefits, and realistic alternatives discussed. Questions answered and patient/representative(s) expressed understanding.     - Discussed with:  Patient      - Extended Intubation/Ventilatory Support Discussed: Yes.      - Patient is DNR/DNI Status: No    Use of blood products discussed: No .     Postoperative Care    Pain management: IV analgesics, Oral pain medications, Multi-modal analgesia.   PONV prophylaxis: Ondansetron (or other 5HT-3), Dexamethasone or Solumedrol     Comments:                Lisa Mccollum MD

## 2021-09-07 ENCOUNTER — ANESTHESIA (OUTPATIENT)
Dept: SURGERY | Facility: CLINIC | Age: 41
End: 2021-09-07

## 2021-09-07 ENCOUNTER — HOSPITAL ENCOUNTER (OUTPATIENT)
Facility: CLINIC | Age: 41
Discharge: HOME | End: 2021-09-07
Attending: INTERNAL MEDICINE | Admitting: INTERNAL MEDICINE

## 2021-09-07 ENCOUNTER — APPOINTMENT (OUTPATIENT)
Dept: GENERAL RADIOLOGY | Facility: CLINIC | Age: 41
End: 2021-09-07
Attending: INTERNAL MEDICINE

## 2021-09-07 VITALS
OXYGEN SATURATION: 100 % | WEIGHT: 141.98 LBS | DIASTOLIC BLOOD PRESSURE: 91 MMHG | RESPIRATION RATE: 16 BRPM | BODY MASS INDEX: 25.16 KG/M2 | SYSTOLIC BLOOD PRESSURE: 146 MMHG | HEIGHT: 63 IN | HEART RATE: 63 BPM | TEMPERATURE: 97.9 F

## 2021-09-07 DIAGNOSIS — K86.89 PANCREATIC STONES: ICD-10-CM

## 2021-09-07 LAB
ALBUMIN SERPL-MCNC: 3.5 G/DL (ref 3.4–5)
ALP SERPL-CCNC: 75 U/L (ref 40–150)
ALT SERPL W P-5'-P-CCNC: 26 U/L (ref 0–50)
AMYLASE SERPL-CCNC: 98 U/L (ref 30–110)
ANION GAP SERPL CALCULATED.3IONS-SCNC: 4 MMOL/L (ref 3–14)
AST SERPL W P-5'-P-CCNC: 20 U/L (ref 0–45)
BILIRUB SERPL-MCNC: 0.3 MG/DL (ref 0.2–1.3)
BUN SERPL-MCNC: 14 MG/DL (ref 7–30)
CALCIUM SERPL-MCNC: 8.8 MG/DL (ref 8.5–10.1)
CHLORIDE BLD-SCNC: 105 MMOL/L (ref 94–109)
CO2 SERPL-SCNC: 25 MMOL/L (ref 20–32)
CREAT SERPL-MCNC: 0.68 MG/DL (ref 0.52–1.04)
ERYTHROCYTE [DISTWIDTH] IN BLOOD BY AUTOMATED COUNT: 14.6 % (ref 10–15)
GFR SERPL CREATININE-BSD FRML MDRD: >90 ML/MIN/1.73M2
GLUCOSE BLD-MCNC: 95 MG/DL (ref 70–99)
GLUCOSE BLDC GLUCOMTR-MCNC: 86 MG/DL (ref 70–99)
HCT VFR BLD AUTO: 34.5 % (ref 35–47)
HGB BLD-MCNC: 11.2 G/DL (ref 11.7–15.7)
INR PPP: 1.23 (ref 0.85–1.15)
LIPASE SERPL-CCNC: 103 U/L (ref 73–393)
MCH RBC QN AUTO: 25.9 PG (ref 26.5–33)
MCHC RBC AUTO-ENTMCNC: 32.5 G/DL (ref 31.5–36.5)
MCV RBC AUTO: 80 FL (ref 78–100)
PLATELET # BLD AUTO: 216 10E3/UL (ref 150–450)
POTASSIUM BLD-SCNC: 3.9 MMOL/L (ref 3.4–5.3)
PROT SERPL-MCNC: 7.4 G/DL (ref 6.8–8.8)
RBC # BLD AUTO: 4.33 10E6/UL (ref 3.8–5.2)
SODIUM SERPL-SCNC: 134 MMOL/L (ref 133–144)
WBC # BLD AUTO: 8.4 10E3/UL (ref 4–11)

## 2021-09-07 PROCEDURE — C1726 CATH, BAL DIL, NON-VASCULAR: HCPCS | Performed by: INTERNAL MEDICINE

## 2021-09-07 PROCEDURE — 710N000010 HC RECOVERY PHASE 1, LEVEL 2, PER MIN: Performed by: INTERNAL MEDICINE

## 2021-09-07 PROCEDURE — 85610 PROTHROMBIN TIME: CPT | Performed by: INTERNAL MEDICINE

## 2021-09-07 PROCEDURE — 250N000025 HC SEVOFLURANE, PER MIN: Performed by: INTERNAL MEDICINE

## 2021-09-07 PROCEDURE — 82150 ASSAY OF AMYLASE: CPT | Performed by: INTERNAL MEDICINE

## 2021-09-07 PROCEDURE — C1877 STENT, NON-COAT/COV W/O DEL: HCPCS | Performed by: INTERNAL MEDICINE

## 2021-09-07 PROCEDURE — 250N000009 HC RX 250: Performed by: INTERNAL MEDICINE

## 2021-09-07 PROCEDURE — 85027 COMPLETE CBC AUTOMATED: CPT | Performed by: INTERNAL MEDICINE

## 2021-09-07 PROCEDURE — 710N000012 HC RECOVERY PHASE 2, PER MINUTE: Performed by: INTERNAL MEDICINE

## 2021-09-07 PROCEDURE — 255N000002 HC RX 255 OP 636: Performed by: INTERNAL MEDICINE

## 2021-09-07 PROCEDURE — 258N000003 HC RX IP 258 OP 636: Performed by: STUDENT IN AN ORGANIZED HEALTH CARE EDUCATION/TRAINING PROGRAM

## 2021-09-07 PROCEDURE — C1769 GUIDE WIRE: HCPCS | Performed by: INTERNAL MEDICINE

## 2021-09-07 PROCEDURE — 250N000009 HC RX 250: Performed by: STUDENT IN AN ORGANIZED HEALTH CARE EDUCATION/TRAINING PROGRAM

## 2021-09-07 PROCEDURE — 36415 COLL VENOUS BLD VENIPUNCTURE: CPT | Performed by: INTERNAL MEDICINE

## 2021-09-07 PROCEDURE — 999N000181 XR SURGERY CARM FLUORO GREATER THAN 5 MIN W STILLS: Mod: TC

## 2021-09-07 PROCEDURE — 250N000011 HC RX IP 250 OP 636: Performed by: STUDENT IN AN ORGANIZED HEALTH CARE EDUCATION/TRAINING PROGRAM

## 2021-09-07 PROCEDURE — 250N000013 HC RX MED GY IP 250 OP 250 PS 637: Performed by: STUDENT IN AN ORGANIZED HEALTH CARE EDUCATION/TRAINING PROGRAM

## 2021-09-07 PROCEDURE — 272N000001 HC OR GENERAL SUPPLY STERILE: Performed by: INTERNAL MEDICINE

## 2021-09-07 PROCEDURE — 370N000017 HC ANESTHESIA TECHNICAL FEE, PER MIN: Performed by: INTERNAL MEDICINE

## 2021-09-07 PROCEDURE — 999N000141 HC STATISTIC PRE-PROCEDURE NURSING ASSESSMENT: Performed by: INTERNAL MEDICINE

## 2021-09-07 PROCEDURE — 83690 ASSAY OF LIPASE: CPT | Performed by: INTERNAL MEDICINE

## 2021-09-07 PROCEDURE — 80053 COMPREHEN METABOLIC PANEL: CPT | Performed by: INTERNAL MEDICINE

## 2021-09-07 PROCEDURE — 360N000082 HC SURGERY LEVEL 2 W/ FLUORO, PER MIN: Performed by: INTERNAL MEDICINE

## 2021-09-07 DEVICE — STENT FREEMAN PANCREA FLEX 4FRX3CM W/O FLANGE SGL PIGTAIL: Type: IMPLANTABLE DEVICE | Site: PANCREATIC DUCT | Status: FUNCTIONAL

## 2021-09-07 DEVICE — STENT FREEMAN PANCREA FLEX 4FRX11CM W/O FLANGE SGL PIGTAIL: Type: IMPLANTABLE DEVICE | Site: PANCREATIC DUCT | Status: FUNCTIONAL

## 2021-09-07 DEVICE — STENT FREEMAN PANCREA FLEX 5FRX9CM SGL PIGTAIL: Type: IMPLANTABLE DEVICE | Site: PANCREATIC DUCT | Status: FUNCTIONAL

## 2021-09-07 RX ORDER — ESMOLOL HYDROCHLORIDE 10 MG/ML
INJECTION INTRAVENOUS PRN
Status: DISCONTINUED | OUTPATIENT
Start: 2021-09-07 | End: 2021-09-07

## 2021-09-07 RX ORDER — HYDRALAZINE HYDROCHLORIDE 20 MG/ML
2.5-5 INJECTION INTRAMUSCULAR; INTRAVENOUS EVERY 10 MIN PRN
Status: DISCONTINUED | OUTPATIENT
Start: 2021-09-07 | End: 2021-09-07 | Stop reason: HOSPADM

## 2021-09-07 RX ORDER — ONDANSETRON 4 MG/1
4 TABLET, ORALLY DISINTEGRATING ORAL EVERY 6 HOURS PRN
Status: DISCONTINUED | OUTPATIENT
Start: 2021-09-07 | End: 2021-09-07 | Stop reason: HOSPADM

## 2021-09-07 RX ORDER — SODIUM CHLORIDE, SODIUM LACTATE, POTASSIUM CHLORIDE, CALCIUM CHLORIDE 600; 310; 30; 20 MG/100ML; MG/100ML; MG/100ML; MG/100ML
INJECTION, SOLUTION INTRAVENOUS CONTINUOUS
Status: DISCONTINUED | OUTPATIENT
Start: 2021-09-07 | End: 2021-09-07 | Stop reason: HOSPADM

## 2021-09-07 RX ORDER — LIDOCAINE 40 MG/G
CREAM TOPICAL
Status: DISCONTINUED | OUTPATIENT
Start: 2021-09-07 | End: 2021-09-07 | Stop reason: HOSPADM

## 2021-09-07 RX ORDER — FENTANYL CITRATE 50 UG/ML
25 INJECTION, SOLUTION INTRAMUSCULAR; INTRAVENOUS EVERY 5 MIN PRN
Status: DISCONTINUED | OUTPATIENT
Start: 2021-09-07 | End: 2021-09-07 | Stop reason: HOSPADM

## 2021-09-07 RX ORDER — DEXAMETHASONE SODIUM PHOSPHATE 4 MG/ML
INJECTION, SOLUTION INTRA-ARTICULAR; INTRALESIONAL; INTRAMUSCULAR; INTRAVENOUS; SOFT TISSUE PRN
Status: DISCONTINUED | OUTPATIENT
Start: 2021-09-07 | End: 2021-09-07

## 2021-09-07 RX ORDER — SODIUM CHLORIDE, SODIUM LACTATE, POTASSIUM CHLORIDE, CALCIUM CHLORIDE 600; 310; 30; 20 MG/100ML; MG/100ML; MG/100ML; MG/100ML
INJECTION, SOLUTION INTRAVENOUS CONTINUOUS
Status: DISCONTINUED | OUTPATIENT
Start: 2021-09-07 | End: 2021-09-07

## 2021-09-07 RX ORDER — ONDANSETRON 2 MG/ML
4 INJECTION INTRAMUSCULAR; INTRAVENOUS EVERY 30 MIN PRN
Status: DISCONTINUED | OUTPATIENT
Start: 2021-09-07 | End: 2021-09-07 | Stop reason: HOSPADM

## 2021-09-07 RX ORDER — NALOXONE HYDROCHLORIDE 0.4 MG/ML
0.2 INJECTION, SOLUTION INTRAMUSCULAR; INTRAVENOUS; SUBCUTANEOUS
Status: DISCONTINUED | OUTPATIENT
Start: 2021-09-07 | End: 2021-09-07 | Stop reason: HOSPADM

## 2021-09-07 RX ORDER — FLUMAZENIL 0.1 MG/ML
0.2 INJECTION, SOLUTION INTRAVENOUS
Status: DISCONTINUED | OUTPATIENT
Start: 2021-09-07 | End: 2021-09-07 | Stop reason: HOSPADM

## 2021-09-07 RX ORDER — LABETALOL HYDROCHLORIDE 5 MG/ML
10 INJECTION, SOLUTION INTRAVENOUS
Status: DISCONTINUED | OUTPATIENT
Start: 2021-09-07 | End: 2021-09-07 | Stop reason: HOSPADM

## 2021-09-07 RX ORDER — LEVOFLOXACIN 5 MG/ML
INJECTION, SOLUTION INTRAVENOUS PRN
Status: DISCONTINUED | OUTPATIENT
Start: 2021-09-07 | End: 2021-09-07

## 2021-09-07 RX ORDER — HYDROMORPHONE HCL IN WATER/PF 6 MG/30 ML
0.2 PATIENT CONTROLLED ANALGESIA SYRINGE INTRAVENOUS EVERY 5 MIN PRN
Status: DISCONTINUED | OUTPATIENT
Start: 2021-09-07 | End: 2021-09-07 | Stop reason: HOSPADM

## 2021-09-07 RX ORDER — FENTANYL CITRATE 50 UG/ML
INJECTION, SOLUTION INTRAMUSCULAR; INTRAVENOUS PRN
Status: DISCONTINUED | OUTPATIENT
Start: 2021-09-07 | End: 2021-09-07

## 2021-09-07 RX ORDER — LIDOCAINE HYDROCHLORIDE 20 MG/ML
INJECTION, SOLUTION INFILTRATION; PERINEURAL PRN
Status: DISCONTINUED | OUTPATIENT
Start: 2021-09-07 | End: 2021-09-07

## 2021-09-07 RX ORDER — ONDANSETRON 4 MG/1
4 TABLET, ORALLY DISINTEGRATING ORAL EVERY 30 MIN PRN
Status: DISCONTINUED | OUTPATIENT
Start: 2021-09-07 | End: 2021-09-07 | Stop reason: HOSPADM

## 2021-09-07 RX ORDER — NALOXONE HYDROCHLORIDE 0.4 MG/ML
0.4 INJECTION, SOLUTION INTRAMUSCULAR; INTRAVENOUS; SUBCUTANEOUS
Status: DISCONTINUED | OUTPATIENT
Start: 2021-09-07 | End: 2021-09-07 | Stop reason: HOSPADM

## 2021-09-07 RX ORDER — IOPAMIDOL 510 MG/ML
INJECTION, SOLUTION INTRAVASCULAR PRN
Status: DISCONTINUED | OUTPATIENT
Start: 2021-09-07 | End: 2021-09-07 | Stop reason: HOSPADM

## 2021-09-07 RX ORDER — ONDANSETRON 2 MG/ML
INJECTION INTRAMUSCULAR; INTRAVENOUS PRN
Status: DISCONTINUED | OUTPATIENT
Start: 2021-09-07 | End: 2021-09-07

## 2021-09-07 RX ORDER — OXYCODONE HYDROCHLORIDE 5 MG/1
5 TABLET ORAL EVERY 4 HOURS PRN
Status: DISCONTINUED | OUTPATIENT
Start: 2021-09-07 | End: 2021-09-07 | Stop reason: HOSPADM

## 2021-09-07 RX ORDER — ONDANSETRON 2 MG/ML
4 INJECTION INTRAMUSCULAR; INTRAVENOUS EVERY 6 HOURS PRN
Status: DISCONTINUED | OUTPATIENT
Start: 2021-09-07 | End: 2021-09-07 | Stop reason: HOSPADM

## 2021-09-07 RX ORDER — PROPOFOL 10 MG/ML
INJECTION, EMULSION INTRAVENOUS PRN
Status: DISCONTINUED | OUTPATIENT
Start: 2021-09-07 | End: 2021-09-07

## 2021-09-07 RX ORDER — ACETAMINOPHEN 325 MG/1
975 TABLET ORAL ONCE
Status: COMPLETED | OUTPATIENT
Start: 2021-09-07 | End: 2021-09-07

## 2021-09-07 RX ORDER — MEPERIDINE HYDROCHLORIDE 25 MG/ML
12.5 INJECTION INTRAMUSCULAR; INTRAVENOUS; SUBCUTANEOUS
Status: DISCONTINUED | OUTPATIENT
Start: 2021-09-07 | End: 2021-09-07 | Stop reason: HOSPADM

## 2021-09-07 RX ORDER — INDOMETHACIN 50 MG/1
SUPPOSITORY RECTAL PRN
Status: DISCONTINUED | OUTPATIENT
Start: 2021-09-07 | End: 2021-09-07 | Stop reason: HOSPADM

## 2021-09-07 RX ADMIN — LIDOCAINE HYDROCHLORIDE 100 MG: 20 INJECTION, SOLUTION INFILTRATION; PERINEURAL at 10:33

## 2021-09-07 RX ADMIN — OXYCODONE HYDROCHLORIDE 5 MG: 5 TABLET ORAL at 14:44

## 2021-09-07 RX ADMIN — FENTANYL CITRATE 25 MCG: 50 INJECTION, SOLUTION INTRAMUSCULAR; INTRAVENOUS at 12:40

## 2021-09-07 RX ADMIN — MIDAZOLAM 2 MG: 1 INJECTION INTRAMUSCULAR; INTRAVENOUS at 10:27

## 2021-09-07 RX ADMIN — Medication 100 MG: at 10:36

## 2021-09-07 RX ADMIN — FENTANYL CITRATE 25 MCG: 50 INJECTION, SOLUTION INTRAMUSCULAR; INTRAVENOUS at 12:46

## 2021-09-07 RX ADMIN — ROCURONIUM BROMIDE 20 MG: 10 INJECTION INTRAVENOUS at 10:58

## 2021-09-07 RX ADMIN — PROPOFOL 150 MG: 10 INJECTION, EMULSION INTRAVENOUS at 10:34

## 2021-09-07 RX ADMIN — PHENYLEPHRINE HYDROCHLORIDE 100 MCG: 10 INJECTION INTRAVENOUS at 11:11

## 2021-09-07 RX ADMIN — ONDANSETRON 4 MG: 2 INJECTION INTRAMUSCULAR; INTRAVENOUS at 10:47

## 2021-09-07 RX ADMIN — PHENYLEPHRINE HYDROCHLORIDE 100 MCG: 10 INJECTION INTRAVENOUS at 11:18

## 2021-09-07 RX ADMIN — ESMOLOL HYDROCHLORIDE 20 MG: 10 INJECTION, SOLUTION INTRAVENOUS at 11:00

## 2021-09-07 RX ADMIN — ONDANSETRON 4 MG: 2 INJECTION INTRAMUSCULAR; INTRAVENOUS at 12:14

## 2021-09-07 RX ADMIN — FENTANYL CITRATE 100 MCG: 50 INJECTION, SOLUTION INTRAMUSCULAR; INTRAVENOUS at 10:34

## 2021-09-07 RX ADMIN — ESMOLOL HYDROCHLORIDE 20 MG: 10 INJECTION, SOLUTION INTRAVENOUS at 11:04

## 2021-09-07 RX ADMIN — ACETAMINOPHEN 975 MG: 325 TABLET, FILM COATED ORAL at 09:21

## 2021-09-07 RX ADMIN — ROCURONIUM BROMIDE 20 MG: 10 INJECTION INTRAVENOUS at 11:00

## 2021-09-07 RX ADMIN — DEXAMETHASONE SODIUM PHOSPHATE 6 MG: 4 INJECTION, SOLUTION INTRA-ARTICULAR; INTRALESIONAL; INTRAMUSCULAR; INTRAVENOUS; SOFT TISSUE at 10:47

## 2021-09-07 RX ADMIN — PROCHLORPERAZINE EDISYLATE 10 MG: 5 INJECTION INTRAMUSCULAR; INTRAVENOUS at 12:25

## 2021-09-07 RX ADMIN — PROPOFOL 50 MG: 10 INJECTION, EMULSION INTRAVENOUS at 10:36

## 2021-09-07 RX ADMIN — LEVOFLOXACIN 500 MG: 5 INJECTION, SOLUTION INTRAVENOUS at 10:55

## 2021-09-07 RX ADMIN — SODIUM CHLORIDE, POTASSIUM CHLORIDE, SODIUM LACTATE AND CALCIUM CHLORIDE: 600; 310; 30; 20 INJECTION, SOLUTION INTRAVENOUS at 10:27

## 2021-09-07 RX ADMIN — GABAPENTIN 400 MG: 300 CAPSULE ORAL at 09:21

## 2021-09-07 RX ADMIN — SUGAMMADEX 200 MG: 100 INJECTION, SOLUTION INTRAVENOUS at 11:58

## 2021-09-07 ASSESSMENT — MIFFLIN-ST. JEOR: SCORE: 1283.13

## 2021-09-07 NOTE — ANESTHESIA PROCEDURE NOTES
Airway         Procedure Start/Stop Times: 9/7/2021 10:37 AM  Staff -        Anesthesiologist:  Tatiana Cullen MD       CRNA: Gloria Melissa APRN CRNA       Performed By: CRNA  Consent for Airway        Urgency: elective  Indications and Patient Condition       Indications for airway management: alan-procedural       Induction type:intravenous       Mask difficulty assessment: 1 - vent by mask    Final Airway Details       Final airway type: endotracheal airway       Successful airway: ETT - single and Oral  Endotracheal Airway Details        ETT size (mm): 7.0       Cuffed: yes       Successful intubation technique: direct laryngoscopy       DL Blade Type: Tomas 2       Grade View of Cords: 1       Adjucts: stylet       Position: Right       Measured from: gums/teeth       Secured at (cm): 22       Bite Block used: endo bite block with gauze padding.    Post intubation assessment        Placement verified by: capnometry, equal breath sounds and chest rise        Number of attempts at approach: 1       Secured with: pink tape       Ease of procedure: easy       Dentition: Intact and Unchanged

## 2021-09-07 NOTE — DISCHARGE INSTRUCTIONS
[facility], [place]  Same-Day Surgery ERCP Procedure   Adult Discharge Orders & Instructions     You had a procedure known as an Endoscopic Retrograde Cholangiopancreatography (ERCP). Your healthcare provider performed the ERCP to look at your bile or pancreatic ducts, and to locate and/or treat blockages (dilation, stenting, removal, etc.) in these ducts. Often biopsies, small samples of tissue, are taken to help diagnose and/or classify stages of disease growth. This procedure is used to diagnose diseases of the pancreas, bile ducts, pancreatic duct, liver, and gallbladder.     After your procedure   1. Make sure to clarify with your healthcare provider any diet restrictions (For example, clear liquid, low fat, no caffeine, etc.)   2. Do NOT take aspirin containing medications or any other blood-thinning medicines (anticoagulants) until your healthcare provider says it's OK.   3. You MAY be prescribed antibiotics, depending on what was done and/or found during your EUS, make sure to take antibiotics as prescribed by your healthcare provider    For 24 hours after surgery  1. Get plenty of rest.  A responsible adult must stay with you for at least 24 hours after you leave the hospital.   2. Do not drive or use heavy equipment.  If you have weakness or tingling, don't drive or use heavy equipment until this feeling goes away.  3. Do not drink alcohol.  4. Avoid strenuous or risky activities (gym, yoga, cycling, etc.).  Ask for help when climbing stairs.   5. You may feel lightheaded.  IF so, sit for a few minutes before standing.  Have someone help you get up.   6. If you have nausea (feel sick to your stomach): Drink only clear liquids such as apple juice, ginger ale, broth or 7-Up.  Rest may also help.  Be sure to drink enough fluids.  Move to a regular diet as you feel able.  7. If you feel bloated or have too much gas, use a heating pad on your belly to help reduce the discomfort.  This should help you feel better.   8. You may have a slight fever. This is normal for the first 24 hours.   9. You may have a dry mouth, a sore throat, muscle aches or trouble sleeping.  These are normal and will go away after 24 hours. A sore throat is most common. Use lozenges or gargle with salt water to ease the discomfort.   10. Do not make important or legal decisions.      Call your doctor for any of the followin. Chest pain, and/or shortness of breath  2. Abdominal  pain, bloating or cramping that has not improved or does not respond to pain reliving medications (Tylenol or narcotics if prescribed)   3. Difficulty swallowing or feeling as though food or liquids are stuck in your throat   4. Sore throat lasting more than 2 days or pain that has worsened over time   5. Black or tarry stools   6. Nausea and/or vomiting that is not resolving or has not responded to anti-nausea medications prescribed to you   7. It has been over 8 to 10 hours since surgery and you are still not able to urinate (pass water)   8. Headache for over 24 hours   9. Fever over 100.5 F (38 C) lasting more than 24 hours after the procedure   10. Signs of jaundice or blockage (fever, chills, abdominal pain, yellowing of the whites of your eyes, yellowing of your skin, and/or passing darker than normal urine)     To contact a doctor, call:   * Dr Ashford's clinic at [phone] (Monday thru Friday 8:00am to 4:30pm)   * [phone] and ask for the Gastroenterology resident on call (answered 24 hours a day)   * Emergency Department: [facility]: [phone]     Take it easy when you get home.  Remember, same day surgery DOES NOT MEAN SAME DAY RECOVERY!  Healing is a gradual process.  You will need some time to recover - you may be more tired than you realize at first.  Rest and relax for at least the first 24 hours at home.  You'll feel better and heal faster if you take good care of yourself.

## 2021-09-07 NOTE — OR NURSING
Patient c/o chest and upper abdominal pain/tightness, described it as similar to her reflux pain. AMBROSE Yarbrough at bedside assessing patient. No changes in EKG or vitals, per Dr. Yarbrough no need for EKG, its most likely reflux pain and no concern for cardiac involvement. Pain medications given, will continue to monitor.

## 2021-09-07 NOTE — ANESTHESIA CARE TRANSFER NOTE
Patient: Keerthi Montemayor    Procedure(s):  ENDOSCOPIC RETROGRADE CHOLANGIOPANCREATOGRAPHY WITH PANCREATIC DUCT DILATION, STONE EXTRACTION AND STENT PLACEMENT    Diagnosis: Pancreatic stones [K86.89]  Diagnosis Additional Information: No value filed.    Anesthesia Type:   General     Note:    Oropharynx: oropharynx clear of all foreign objects and spontaneously breathing  Level of Consciousness: awake  Oxygen Supplementation: nasal cannula  Level of Supplemental Oxygen (L/min / FiO2): 3  Independent Airway: airway patency satisfactory and stable  Dentition: dentition unchanged  Vital Signs Stable: post-procedure vital signs reviewed and stable  Report to RN Given: handoff report given  Patient transferred to: PACU    Handoff Report: Identifed the Patient, Identified the Reponsible Provider, Reviewed the pertinent medical history, Discussed the surgical course, Reviewed Intra-OP anesthesia mangement and issues during anesthesia, Set expectations for post-procedure period and Allowed opportunity for questions and acknowledgement of understanding      Vitals:  Vitals Value Taken Time   /82 09/07/21 1210   Temp     Pulse 71 09/07/21 1211   Resp     SpO2 100 % 09/07/21 1211   Vitals shown include unvalidated device data.    Electronically Signed By: ROSARIO Oshea CRNA  September 7, 2021  12:12 PM

## 2021-09-07 NOTE — OR NURSING
Called AMBROSE Yarbrough for sign out, verified patient ok to go to phase II, will put in sign out when able.

## 2021-09-07 NOTE — PROGRESS NOTES
1420 Dr. Euceda at bedside updated that Patient's Amylase and Lipase were drawn earlier than intended and the current values; Patient states pain is well controlled and is having no nausea at this time; Dr. Euceda stated that Patient was okay to Discharge; and he is discussing this with Patient at this time; will continue to monitor.

## 2021-09-07 NOTE — ANESTHESIA POSTPROCEDURE EVALUATION
Patient: Keerthi Montemayor    Procedure(s):  ENDOSCOPIC RETROGRADE CHOLANGIOPANCREATOGRAPHY WITH PANCREATIC DUCT DILATION, STONE EXTRACTION AND STENT PLACEMENT    Diagnosis:Pancreatic stones [K86.89]  Diagnosis Additional Information: No value filed.    Anesthesia Type:  General    Note:  Disposition: Outpatient   Postop Pain Control: Uneventful            Sign Out: Well controlled pain   PONV: No   Neuro/Psych: Uneventful            Sign Out: Acceptable/Baseline neuro status   Airway/Respiratory: Uneventful            Sign Out: Acceptable/Baseline resp. status   CV/Hemodynamics: Uneventful            Sign Out: Acceptable CV status   Other NRE: NONE   DID A NON-ROUTINE EVENT OCCUR? No           Last vitals:  Vitals Value Taken Time   /81 09/07/21 1400   Temp 36.9  C (98.4  F) 09/07/21 1330   Pulse 67 09/07/21 1401   Resp 25 09/07/21 1401   SpO2 97 % 09/07/21 1401   Vitals shown include unvalidated device data.    Electronically Signed By: Tatiana Chavez MD  September 7, 2021  2:03 PM

## 2021-09-09 LAB — ERCP: NORMAL

## 2021-09-21 ENCOUNTER — PATIENT OUTREACH (OUTPATIENT)
Dept: GASTROENTEROLOGY | Facility: CLINIC | Age: 41
End: 2021-09-21

## 2021-09-21 DIAGNOSIS — Z46.89 ENCOUNTER FOR REMOVAL OF PANCREATIC STENT: Primary | ICD-10-CM

## 2021-09-21 NOTE — TELEPHONE ENCOUNTER
Post ERCP (9/7/21) with Dr. Ashford: Follow-up    Post procedure recommendations:   Schedule virtual clinic with Dr. Ashford in 3-4 months    for interval monitoring. If patient develops recurrent   intractable abdominal pain, she will need consideration    of TPIAT, given that her dorsal duct is now cleared.    - Order KUB in 4-weeks to confirm spontaneous passage of  pancreatic stents.     Orders placed: xray orders, clinic scheduled    Patient states: MyChart sent     Clinic contact and scheduling numbers verified for future questions/concerns.    Lolita Taylor RN Care Coordinator

## 2021-09-25 ENCOUNTER — HEALTH MAINTENANCE LETTER (OUTPATIENT)
Age: 41
End: 2021-09-25

## 2021-10-06 ENCOUNTER — PATIENT OUTREACH (OUTPATIENT)
Dept: GASTROENTEROLOGY | Facility: CLINIC | Age: 41
End: 2021-10-06

## 2021-10-06 NOTE — TELEPHONE ENCOUNTER
Called patient to remind that xray to check for stent is due. Pt says she will schedule xray appointment.    ML

## 2021-12-08 ENCOUNTER — VIRTUAL VISIT (OUTPATIENT)
Dept: GASTROENTEROLOGY | Facility: CLINIC | Age: 41
End: 2021-12-08
Payer: MEDICAID

## 2021-12-08 DIAGNOSIS — K86.1 CHRONIC RECURRENT PANCREATITIS (H): Primary | ICD-10-CM

## 2021-12-08 PROCEDURE — 99214 OFFICE O/P EST MOD 30 MIN: CPT | Mod: 95 | Performed by: INTERNAL MEDICINE

## 2021-12-08 NOTE — LETTER
12/8/2021         RE: Keerthi Montemayor  [address]  [address]  [facility] [address]        Dear Colleague,    Thank you for referring your patient, Keerthi Montemayor, to the [facility]. Please see a copy of my visit note below.    Keerthi is a 40 year old who is being evaluated via a billable video visit.      How would you like to obtain your AVS? MyChart  If the video visit is dropped, the invitation should be resent by: Text to cell phone: [phone]  Will anyone else be joining your video visit? No  Type of service:  Video Visit        Originating Location (pt. Location): Home    Distant Location (provider location):  [facility]     Platform used for Video Visit: Shena Pendleton is a 40 year old who is being evaluated via a billable video visit.      How would you like to obtain your AVS? MyChart  If the video visit is dropped, the invitation should be resent by: Text to cell phone: [phone]  Will anyone else be joining your video visit? No      Video Start Time: 12:20 PM    Assessment & Plan   40 year old female with history of recurrent acute on chronic pancreatitis secondary to combination of alcohol, tobacco, and pancreas divisum. Found to have multiple pancreatic duct stones with serial ERCPs performed for removal. Last ERCP 9/7/21 with removal of PD stone and debris, dilation of mild dorsal duct stricture, and placement of 3 self ejecting minor papilla stents. Visit today to follow up pain. Overall doing very well with resolution of pain (prior to this was having daily intractable pain) and has been able to start working again. Currently taking gabapentin and tolerating it well. Discussed that relapses may happen in the future which is part of the normal disease course. Encouraged tobacco cessation to help reduce risk of recurrence. No indication for TPIAT at this time.    Plan:  - Follow up in 1 year   -  Continue with efforts at tobacco cessation    Seen and examined with GI fellow, agree with findings and recommendations.  25 mins  Janes Ashford MD GI Staff    No follow-ups on file.    Janes Ashford MD  [facility] [place]  - Follow up in 1 year     Subjective     HPI   40 year old female with history of recurrent acute on chronic pancreatitis secondary to combination of alcohol, tobacco, and pancreas divisum. Found to have multiple pancreatic duct stones with serial ERCPs performed for removal. Last ERCP 9/7/21 with removal of PD stone and debris, dilation of mild dorsal duct stricture, and placement of 3 self ejecting minor papilla stents. Visit today is to address pain and if pain were to be persistent since last ERCP would consider TPIAT.    Since ERCP 3 months ago, she has had barely any pain. Prior to this she had severe intractable pain on a daily basis that prevented her from working. She currently takes gabapentin and occasionally flexeril. She notes anxiety is improved since being on gabapentin and also on cymbalta. She has seen significant improvement in daily life and was able to start working again. She works at [employer]. Continues to smoke tobacco but trying to quit. She has quit drinking alcohol.       Review of Systems   Complete ROS negative unless otherwise stated in HPI above.       Objective    Vitals - Patient Reported  Weight (Patient Reported): 72.6 kg (160 lb)  Pain Score: No Pain (0)    Physical Exam   GENERAL: Healthy, alert and no distress  EYES: Eyes grossly normal to inspection.  No discharge or erythema, or obvious scleral/conjunctival abnormalities.  RESP: No audible wheeze, cough, or visible cyanosis.  No visible retractions or increased work of breathing.    SKIN: Visible skin clear. No significant rash, abnormal pigmentation or lesions.  NEURO: Cranial nerves grossly intact.  Mentation and speech appropriate for  "age.  PSYCH: Mentation appears normal, affect normal/bright, judgement and insight intact, normal speech and appearance well-groomed.    ERCP from 9/7/21 overall impression:             \"Endoscopic therapy appears                        complete for now as patient continues to do very well                        since last ERCP and stones clearance from dorsal duct.                        Today we further ensured that no dosral duct obstruction                        and 3 self ejecting stent were placed to help with                        post-procedural edema and inflammation.\"           Again, thank you for allowing me to participate in the care of your patient.        Sincerely,        Janes Ashford MD    "

## 2021-12-08 NOTE — PROGRESS NOTES
Keerthi is a 40 year old who is being evaluated via a billable video visit.      How would you like to obtain your AVS? [email or web address]  If the video visit is dropped, the invitation should be resent by: Text to cell phone: [phone]  Will anyone else be joining your video visit? No  Type of service:  Video Visit        Originating Location (pt. Location): Home    Distant Location (provider location):  [facility] [place]     Platform used for Video Visit: [employer]

## 2021-12-08 NOTE — PROGRESS NOTES
Keerthi is a 40 year old who is being evaluated via a billable video visit.      How would you like to obtain your AVS? MyChart  If the video visit is dropped, the invitation should be resent by: Text to cell phone: [phone]  Will anyone else be joining your video visit? No      Video Start Time: 12:20 PM    Assessment & Plan   40 year old female with history of recurrent acute on chronic pancreatitis secondary to combination of alcohol, tobacco, and pancreas divisum. Found to have multiple pancreatic duct stones with serial ERCPs performed for removal. Last ERCP 9/7/21 with removal of PD stone and debris, dilation of mild dorsal duct stricture, and placement of 3 self ejecting minor papilla stents. Visit today to follow up pain. Overall doing very well with resolution of pain (prior to this was having daily intractable pain) and has been able to start working again. Currently taking gabapentin and tolerating it well. Discussed that relapses may happen in the future which is part of the normal disease course. Encouraged tobacco cessation to help reduce risk of recurrence. No indication for TPIAT at this time.    Plan:  - Follow up in 1 year   - Continue with efforts at tobacco cessation    Seen and examined with GI fellow, agree with findings and recommendations.  25 mins  Janes Ashford MD GI Staff    No follow-ups on file.    Janes Ashford MD  [facility] [place]  - Follow up in 1 year     Subjective     HPI   40 year old female with history of recurrent acute on chronic pancreatitis secondary to combination of alcohol, tobacco, and pancreas divisum. Found to have multiple pancreatic duct stones with serial ERCPs performed for removal. Last ERCP 9/7/21 with removal of PD stone and debris, dilation of mild dorsal duct stricture, and placement of 3 self ejecting minor papilla stents. Visit today is to address pain and if pain were to be persistent since last ERCP would  "consider TPIAT.    Since ERCP 3 months ago, she has had barely any pain. Prior to this she had severe intractable pain on a daily basis that prevented her from working. She currently takes gabapentin and occasionally flexeril. She notes anxiety is improved since being on gabapentin and also on cymbalta. She has seen significant improvement in daily life and was able to start working again. She works at [employer]. Continues to smoke tobacco but trying to quit. She has quit drinking alcohol.       Review of Systems   Complete ROS negative unless otherwise stated in HPI above.       Objective    Vitals - Patient Reported  Weight (Patient Reported): 72.6 kg (160 lb)  Pain Score: No Pain (0)    Physical Exam   GENERAL: Healthy, alert and no distress  EYES: Eyes grossly normal to inspection.  No discharge or erythema, or obvious scleral/conjunctival abnormalities.  RESP: No audible wheeze, cough, or visible cyanosis.  No visible retractions or increased work of breathing.    SKIN: Visible skin clear. No significant rash, abnormal pigmentation or lesions.  NEURO: Cranial nerves grossly intact.  Mentation and speech appropriate for age.  PSYCH: Mentation appears normal, affect normal/bright, judgement and insight intact, normal speech and appearance well-groomed.    ERCP from 9/7/21 overall impression:             \"Endoscopic therapy appears                        complete for now as patient continues to do very well                        since last ERCP and stones clearance from dorsal duct.                        Today we further ensured that no dosral duct obstruction                        and 3 self ejecting stent were placed to help with                        post-procedural edema and inflammation.\"             Video-Visit Details    Type of service:  Video Visit    Video End Time:12:35 PM    Originating Location (pt. Location): Home    Distant Location (provider location):  [facility] [place]     Platform used for Video Visit: Shena

## 2021-12-09 NOTE — PATIENT INSTRUCTIONS
Follow up:    Dr. Ashford has outlined the following steps after your recent clinic visit:    - Follow up in 1 year   - Continue with efforts at tobacco cessation       Please call with any questions or concerns regarding your clinic visit today.    It is a pleasure being involved in your health care.    Contacts post-consultation depending on your need:    Schedule Clinic Appointments            [phone] # 1   M-F 7:30 - 5 pm    Lolita Taylor RN Care Coordinator  [phone]    Bandar Javier LPN    [phone]     OR Procedure Scheduling                             [phone]    My Chart is available 24 hours a day and is a secure way to access your records and communicate with your care team.  I strongly recommend signing up if you haven't already done so, if you are comfortable with computers.  If you would like to inquire about this or are having problems with My Chart access, you may call [phone] or go online at [email or web address].  Please allow at least 24 hours for a response and extra time on weekends and Holidays.

## 2022-05-07 ENCOUNTER — HEALTH MAINTENANCE LETTER (OUTPATIENT)
Age: 42
End: 2022-05-07

## 2022-12-26 ENCOUNTER — HEALTH MAINTENANCE LETTER (OUTPATIENT)
Age: 42
End: 2022-12-26

## 2023-06-02 ENCOUNTER — HEALTH MAINTENANCE LETTER (OUTPATIENT)
Age: 43
End: 2023-06-02

## 2023-09-19 NOTE — BRIEF OP NOTE
[facility]    Brief Operative Note    Pre-operative diagnosis: Pancreatic stones [K86.89]  Post-operative diagnosis As prior    Procedure: Procedure(s):  ENDOSCOPIC RETROGRADE CHOLANGIOPANCREATOGRAPHY, WITH DIRECT DUCT VISUALIZATION, USING PANCREATICOBILIARY FIBEROPTIC PROBE  Surgeon: Surgeon(s) and Role:     * Janes Ashford MD - Primary  Anesthesia: General   Estimated blood loss: Minimal  Drains: None  Specimens:   Findings:   None.  Complications: None.  Implants: * No surgical log found *      41 yo F w/ h/o recurrent acute-on-chronic pancreatitis (etiology: EtOH + tobacco] x8-yrs). S/P EUS (12/11/20; Dr. Colmenares) w/ likely pancreas divisum w/ dorsal duct stones. No prior clinical response to PERT. 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 S/P index ERCP w/ traction-type minor papillotomy + dorsal duct debris extraction (stone not extracted) + Advanix 3-Fr stents (x2) + biliary sphincterotomy + self-ejecting biliary stent (3/23/21). S/P 2nd ERCP w/ dorsal duct stone extraction + transpapillary (minor) Zimmon 4-Fr x 8-cm SPS + Zimmon 4-Fr x 6-cm SPS (4/27/21). After last ERCP, patient had 2-3 weeks of pain relief, followed by recurrence of pain. ERCP 6/15/21 showed mildly dilated dorsal duct with numerous dilated side branches, divisum vs pseudodivisium, 4 Fr x 11 cm Mansfield placed in dorsal duct.   In the interim, patient had an episode of AIP and found to have another stone in dorsal duct. She presents today for repeat ERCP.    Findings:  - Dorsal pancreatogram w/ moderately dilated (~5 mm) irregular main pancreatic duct, with mild dorsal duct stricture near the minor papilla. No contrast opacification of the duct of [place], indicative of pancreas divisum.   - Stricture dilated to 6 mm. PD stone and debris extracted  via balloon and flower basket sweeps of the dorsal duct.   - Three elf-ejecting transpapillary (minor papilla) Mansfield-Ashford (4-Fr x 11-cm, 5 Fr x 9 xm and 4 Fr x 3 cm) plastic stents deployed into dorsal duct.   ** Overall impression: Endoscopic therapy appears complete for now as patient continues to do very well since last ERCP and stones clearance from dorsal duct. Today we further ensured that no dosral duct obstruction and 3 self ejecting stent were placed to help with post-procedural edema and inflammation.     - Observe patient in PACU for possible discharge same day.   - Schedule virtual clinic with Dr. Ashford in 3-4 months for interval monitoring. If patient develops recurrent intractable abdominal pain, she will need consideration of TPIAT, given that her dorsal duct is now cleared.   - Order KUB in 4-weeks to confirm spontaneous passage of pancreatic stent.   - The findings and recommendations were discussed with the patient's family.            Ivermectin Pregnancy And Lactation Text: This medication is Pregnancy Category C and it isn't known if it is safe during pregnancy. It is also excreted in breast milk.

## 2024-02-04 ENCOUNTER — HEALTH MAINTENANCE LETTER (OUTPATIENT)
Age: 44
End: 2024-02-04

## 2024-06-23 ENCOUNTER — HEALTH MAINTENANCE LETTER (OUTPATIENT)
Age: 44
End: 2024-06-23

## 2024-09-17 ENCOUNTER — TRANSCRIBE ORDERS (OUTPATIENT)
Dept: OTHER | Age: 44
End: 2024-09-17

## 2024-09-17 DIAGNOSIS — K86.1 CHRONIC RECURRENT PANCREATITIS (H): Primary | ICD-10-CM

## 2024-09-17 DIAGNOSIS — Z09 HOSPITAL DISCHARGE FOLLOW-UP: ICD-10-CM

## (undated) DEVICE — ENDO TUBING CO2 SMARTCAP STERILE DISP 100145CO2EXT

## (undated) DEVICE — ENDO FUSION OMNI-TOME 21 FS-OMNI-21 G48675

## (undated) DEVICE — ENDO SEALING CAP SMARTCAP

## (undated) DEVICE — BIOPSY VALVE BIOSHIELD 00711135

## (undated) DEVICE — INFLATION DEVICE BIG 60 ENDO-AN6012

## (undated) DEVICE — STENT FREEMAN PANCREA FLEX 5FRX9CM SGL PIGTAIL
Type: IMPLANTABLE DEVICE | Site: PANCREATIC DUCT | Status: NON-FUNCTIONAL
Removed: 2021-04-27

## (undated) DEVICE — SOL WATER IRRIG 1000ML BOTTLE 2F7114

## (undated) DEVICE — ENDO ADPT CATH ROTATABLE SIDE ARM G22677

## (undated) DEVICE — ENDO BASKET RETRIEVAL F/BILE DUCT STN FG-V431P

## (undated) DEVICE — KIT CONNECTOR FOR OLYMPUS ENDOSCOPES DEFENDO 100310

## (undated) DEVICE — ENDO SNARE POLYPECTOMY OVAL 15MM LOOP SD-240U-15

## (undated) DEVICE — WIRE GUIDE 0.025"X270CM STR VISIGLIDE G-240-2527S

## (undated) DEVICE — KIT ENDO FIRST STEP DISINFECTANT 200ML W/POUCH EP-4

## (undated) DEVICE — ENDO FUSION OMNI-TOME G31903

## (undated) DEVICE — GUIDEWIRE NOVAGOLD .018X480CM STR TIP M00552010

## (undated) DEVICE — CATH BILIARY FUSION ERCP .035"X200CM DOME TIP

## (undated) DEVICE — BALLOON EXTRACTION 15X1950MM 3.2MM TL B-V243Q-A

## (undated) DEVICE — PACK ENDOSCOPY GI CUSTOM UMMC

## (undated) DEVICE — STENT ZIMMON PANCREA 4FRX8CM SGL PIGTAIL G24582
Type: IMPLANTABLE DEVICE | Site: PANCREATIC DUCT | Status: NON-FUNCTIONAL
Removed: 2021-04-27

## (undated) DEVICE — SUCTION MANIFOLD NEPTUNE 2 SYS 4 PORT 0702-020-000

## (undated) DEVICE — ENDO BITE BLOCK ADULT OMNI-BLOC

## (undated) DEVICE — WIRE GUIDE 0.025"X270CM ANG VISIGLIDE G-240-2527A

## (undated) DEVICE — CATH BALLOON DILATING BIL FUSION TITAN 12FR 4MMX4CM G49219

## (undated) DEVICE — ENDO BITE BLOCK PED

## (undated) DEVICE — SOL NACL 0.9% IRRIG 1000ML BOTTLE 07138-09

## (undated) DEVICE — STENT FREEMAN PANCREA FLEX 4FRX11CM W/O FLANGE SGL PIGTAIL
Type: IMPLANTABLE DEVICE | Site: PANCREATIC DUCT | Status: NON-FUNCTIONAL
Removed: 2021-04-27

## (undated) DEVICE — ENDO CATH BALLOON DILATION HURRICANE 06MMX4X180CM M00545920

## (undated) DEVICE — WIRE GUIDE TRACER METRO DIRECT .021"X260CM STR TIP G55705

## (undated) DEVICE — CANNULA BILIARY CONTOUR ERCP .018"X210CM 5-4-3 TIP

## (undated) DEVICE — TUBING SUCTION 10'X3/16" N510

## (undated) DEVICE — ESU GROUND PAD ADULT W/CORD E7507

## (undated) RX ORDER — CEFAZOLIN SODIUM 1 G/3ML
INJECTION, POWDER, FOR SOLUTION INTRAMUSCULAR; INTRAVENOUS
Status: DISPENSED
Start: 2021-09-07

## (undated) RX ORDER — ONDANSETRON 2 MG/ML
INJECTION INTRAMUSCULAR; INTRAVENOUS
Status: DISPENSED
Start: 2021-09-07

## (undated) RX ORDER — SODIUM CHLORIDE, SODIUM LACTATE, POTASSIUM CHLORIDE, CALCIUM CHLORIDE 600; 310; 30; 20 MG/100ML; MG/100ML; MG/100ML; MG/100ML
INJECTION, SOLUTION INTRAVENOUS
Status: DISPENSED
Start: 2021-04-27

## (undated) RX ORDER — FENTANYL CITRATE-0.9 % NACL/PF 10 MCG/ML
PLASTIC BAG, INJECTION (ML) INTRAVENOUS
Status: DISPENSED
Start: 2021-09-07

## (undated) RX ORDER — INDOMETHACIN 50 MG/1
SUPPOSITORY RECTAL
Status: DISPENSED
Start: 2021-04-27

## (undated) RX ORDER — PROPOFOL 10 MG/ML
INJECTION, EMULSION INTRAVENOUS
Status: DISPENSED
Start: 2021-04-27

## (undated) RX ORDER — EPHEDRINE SULFATE 50 MG/ML
INJECTION, SOLUTION INTRAMUSCULAR; INTRAVENOUS; SUBCUTANEOUS
Status: DISPENSED
Start: 2021-09-07

## (undated) RX ORDER — IOPAMIDOL 510 MG/ML
INJECTION, SOLUTION INTRAVASCULAR
Status: DISPENSED
Start: 2021-04-27

## (undated) RX ORDER — PROPOFOL 10 MG/ML
INJECTION, EMULSION INTRAVENOUS
Status: DISPENSED
Start: 2021-09-07

## (undated) RX ORDER — SIMETHICONE 40MG/0.6ML
SUSPENSION, DROPS(FINAL DOSAGE FORM)(ML) ORAL
Status: DISPENSED
Start: 2021-09-07

## (undated) RX ORDER — LIDOCAINE HYDROCHLORIDE 20 MG/ML
INJECTION, SOLUTION EPIDURAL; INFILTRATION; INTRACAUDAL; PERINEURAL
Status: DISPENSED
Start: 2021-09-07

## (undated) RX ORDER — LIDOCAINE HYDROCHLORIDE 20 MG/ML
INJECTION, SOLUTION EPIDURAL; INFILTRATION; INTRACAUDAL; PERINEURAL
Status: DISPENSED
Start: 2021-06-15

## (undated) RX ORDER — ONDANSETRON 2 MG/ML
INJECTION INTRAMUSCULAR; INTRAVENOUS
Status: DISPENSED
Start: 2021-06-15

## (undated) RX ORDER — DEXAMETHASONE SODIUM PHOSPHATE 4 MG/ML
INJECTION, SOLUTION INTRA-ARTICULAR; INTRALESIONAL; INTRAMUSCULAR; INTRAVENOUS; SOFT TISSUE
Status: DISPENSED
Start: 2021-09-07

## (undated) RX ORDER — ONDANSETRON 2 MG/ML
INJECTION INTRAMUSCULAR; INTRAVENOUS
Status: DISPENSED
Start: 2021-03-23

## (undated) RX ORDER — ALBUTEROL SULFATE 0.83 MG/ML
SOLUTION RESPIRATORY (INHALATION)
Status: DISPENSED
Start: 2021-04-27

## (undated) RX ORDER — HYDROMORPHONE HYDROCHLORIDE 1 MG/ML
INJECTION, SOLUTION INTRAMUSCULAR; INTRAVENOUS; SUBCUTANEOUS
Status: DISPENSED
Start: 2021-04-27

## (undated) RX ORDER — ONDANSETRON 2 MG/ML
INJECTION INTRAMUSCULAR; INTRAVENOUS
Status: DISPENSED
Start: 2021-04-27

## (undated) RX ORDER — OXYCODONE AND ACETAMINOPHEN 5; 325 MG/1; MG/1
TABLET ORAL
Status: DISPENSED
Start: 2021-03-23

## (undated) RX ORDER — ONDANSETRON 4 MG/1
TABLET, FILM COATED ORAL
Status: DISPENSED
Start: 2021-06-15

## (undated) RX ORDER — HYDROMORPHONE HYDROCHLORIDE 1 MG/ML
INJECTION, SOLUTION INTRAMUSCULAR; INTRAVENOUS; SUBCUTANEOUS
Status: DISPENSED
Start: 2021-06-15

## (undated) RX ORDER — OXYCODONE HYDROCHLORIDE 5 MG/1
TABLET ORAL
Status: DISPENSED
Start: 2021-06-15

## (undated) RX ORDER — IOPAMIDOL 510 MG/ML
INJECTION, SOLUTION INTRAVASCULAR
Status: DISPENSED
Start: 2021-03-23

## (undated) RX ORDER — DEXAMETHASONE SODIUM PHOSPHATE 4 MG/ML
INJECTION, SOLUTION INTRA-ARTICULAR; INTRALESIONAL; INTRAMUSCULAR; INTRAVENOUS; SOFT TISSUE
Status: DISPENSED
Start: 2021-06-15

## (undated) RX ORDER — ACETAMINOPHEN 325 MG/1
TABLET ORAL
Status: DISPENSED
Start: 2021-09-07

## (undated) RX ORDER — FENTANYL CITRATE 50 UG/ML
INJECTION, SOLUTION INTRAMUSCULAR; INTRAVENOUS
Status: DISPENSED
Start: 2021-03-23

## (undated) RX ORDER — FENTANYL CITRATE 50 UG/ML
INJECTION, SOLUTION INTRAMUSCULAR; INTRAVENOUS
Status: DISPENSED
Start: 2021-06-15

## (undated) RX ORDER — FENTANYL CITRATE 50 UG/ML
INJECTION, SOLUTION INTRAMUSCULAR; INTRAVENOUS
Status: DISPENSED
Start: 2021-04-27

## (undated) RX ORDER — ACETAMINOPHEN 325 MG/1
TABLET ORAL
Status: DISPENSED
Start: 2021-06-15

## (undated) RX ORDER — INDOMETHACIN 50 MG/1
SUPPOSITORY RECTAL
Status: DISPENSED
Start: 2021-09-07

## (undated) RX ORDER — FENTANYL CITRATE 50 UG/ML
INJECTION, SOLUTION INTRAMUSCULAR; INTRAVENOUS
Status: DISPENSED
Start: 2021-09-07

## (undated) RX ORDER — OXYCODONE HYDROCHLORIDE 5 MG/1
TABLET ORAL
Status: DISPENSED
Start: 2021-09-07

## (undated) RX ORDER — INDOMETHACIN 50 MG/1
SUPPOSITORY RECTAL
Status: DISPENSED
Start: 2021-06-15

## (undated) RX ORDER — IOPAMIDOL 510 MG/ML
INJECTION, SOLUTION INTRAVASCULAR
Status: DISPENSED
Start: 2021-06-15

## (undated) RX ORDER — GABAPENTIN 300 MG/1
CAPSULE ORAL
Status: DISPENSED
Start: 2021-09-07

## (undated) RX ORDER — ESMOLOL HYDROCHLORIDE 10 MG/ML
INJECTION INTRAVENOUS
Status: DISPENSED
Start: 2021-04-27

## (undated) RX ORDER — CITRIC ACID/SODIUM CITRATE 334-500MG
SOLUTION, ORAL ORAL
Status: DISPENSED
Start: 2021-04-27

## (undated) RX ORDER — GABAPENTIN 100 MG/1
CAPSULE ORAL
Status: DISPENSED
Start: 2021-09-07

## (undated) RX ORDER — HYDROMORPHONE HYDROCHLORIDE 1 MG/ML
INJECTION, SOLUTION INTRAMUSCULAR; INTRAVENOUS; SUBCUTANEOUS
Status: DISPENSED
Start: 2021-03-23

## (undated) RX ORDER — ESMOLOL HYDROCHLORIDE 10 MG/ML
INJECTION INTRAVENOUS
Status: DISPENSED
Start: 2021-09-07

## (undated) RX ORDER — IOPAMIDOL 510 MG/ML
INJECTION, SOLUTION INTRAVASCULAR
Status: DISPENSED
Start: 2021-09-07

## (undated) RX ORDER — OXYCODONE HYDROCHLORIDE 5 MG/1
TABLET ORAL
Status: DISPENSED
Start: 2021-04-27

## (undated) RX ORDER — PROPOFOL 10 MG/ML
INJECTION, EMULSION INTRAVENOUS
Status: DISPENSED
Start: 2021-06-15

## (undated) RX ORDER — LIDOCAINE HYDROCHLORIDE 20 MG/ML
INJECTION, SOLUTION EPIDURAL; INFILTRATION; INTRACAUDAL; PERINEURAL
Status: DISPENSED
Start: 2021-04-27

## (undated) RX ORDER — GLYCOPYRROLATE 0.2 MG/ML
INJECTION, SOLUTION INTRAMUSCULAR; INTRAVENOUS
Status: DISPENSED
Start: 2021-09-07